# Patient Record
Sex: FEMALE | Race: OTHER | HISPANIC OR LATINO
[De-identification: names, ages, dates, MRNs, and addresses within clinical notes are randomized per-mention and may not be internally consistent; named-entity substitution may affect disease eponyms.]

---

## 2019-07-12 ENCOUNTER — TRANSCRIPTION ENCOUNTER (OUTPATIENT)
Age: 59
End: 2019-07-12

## 2019-07-16 ENCOUNTER — TRANSCRIPTION ENCOUNTER (OUTPATIENT)
Age: 59
End: 2019-07-16

## 2019-07-26 PROBLEM — Z00.00 ENCOUNTER FOR PREVENTIVE HEALTH EXAMINATION: Status: ACTIVE | Noted: 2019-07-26

## 2022-03-17 VITALS — BODY MASS INDEX: 28.7 KG/M2 | WEIGHT: 162 LBS | HEIGHT: 63 IN

## 2022-03-17 DIAGNOSIS — I27.20 PULMONARY HYPERTENSION, UNSPECIFIED: ICD-10-CM

## 2022-03-17 DIAGNOSIS — Z86.39 PERSONAL HISTORY OF OTHER ENDOCRINE, NUTRITIONAL AND METABOLIC DISEASE: ICD-10-CM

## 2022-03-17 DIAGNOSIS — I10 ESSENTIAL (PRIMARY) HYPERTENSION: ICD-10-CM

## 2022-03-17 RX ORDER — SIMVASTATIN 10 MG/1
10 TABLET, FILM COATED ORAL
Qty: 30 | Refills: 3 | Status: ACTIVE | COMMUNITY

## 2022-03-17 RX ORDER — LEVOTHYROXINE SODIUM 88 UG/1
88 TABLET ORAL DAILY
Refills: 0 | Status: ACTIVE | COMMUNITY

## 2022-03-17 RX ORDER — AMLODIPINE BESYLATE AND BENAZEPRIL HYDROCHLORIDE 5; 40 MG/1; MG/1
5-40 CAPSULE ORAL DAILY
Refills: 0 | Status: COMPLETED | COMMUNITY
End: 2022-03-17

## 2022-03-17 RX ORDER — AMLODIPINE BESYLATE 5 MG/1
5 TABLET ORAL
Refills: 0 | Status: ACTIVE | COMMUNITY

## 2022-03-22 ENCOUNTER — OUTPATIENT (OUTPATIENT)
Dept: OUTPATIENT SERVICES | Facility: HOSPITAL | Age: 62
LOS: 1 days | End: 2022-03-22
Payer: COMMERCIAL

## 2022-03-22 ENCOUNTER — RESULT REVIEW (OUTPATIENT)
Age: 62
End: 2022-03-22

## 2022-03-22 ENCOUNTER — NON-APPOINTMENT (OUTPATIENT)
Age: 62
End: 2022-03-22

## 2022-03-22 ENCOUNTER — APPOINTMENT (OUTPATIENT)
Dept: CARDIOTHORACIC SURGERY | Facility: CLINIC | Age: 62
End: 2022-03-22
Payer: COMMERCIAL

## 2022-03-22 VITALS
DIASTOLIC BLOOD PRESSURE: 72 MMHG | WEIGHT: 136 LBS | SYSTOLIC BLOOD PRESSURE: 133 MMHG | TEMPERATURE: 97.1 F | HEIGHT: 63 IN | HEART RATE: 54 BPM | OXYGEN SATURATION: 97 % | RESPIRATION RATE: 18 BRPM | BODY MASS INDEX: 24.1 KG/M2

## 2022-03-22 LAB
A1C WITH ESTIMATED AVERAGE GLUCOSE RESULT: 5.4 % — SIGNIFICANT CHANGE UP (ref 4–5.6)
ALBUMIN SERPL ELPH-MCNC: 4.1 G/DL — SIGNIFICANT CHANGE UP (ref 3.3–5)
ALP SERPL-CCNC: 225 U/L — HIGH (ref 40–120)
ALT FLD-CCNC: 27 U/L — SIGNIFICANT CHANGE UP (ref 10–45)
ANION GAP SERPL CALC-SCNC: 13 MMOL/L — SIGNIFICANT CHANGE UP (ref 5–17)
APPEARANCE UR: CLEAR — SIGNIFICANT CHANGE UP
APTT BLD: 34.2 SEC — SIGNIFICANT CHANGE UP (ref 27.5–35.5)
AST SERPL-CCNC: 38 U/L — SIGNIFICANT CHANGE UP (ref 10–40)
BASOPHILS # BLD AUTO: 0.04 K/UL — SIGNIFICANT CHANGE UP (ref 0–0.2)
BASOPHILS NFR BLD AUTO: 0.6 % — SIGNIFICANT CHANGE UP (ref 0–2)
BILIRUB SERPL-MCNC: 0.4 MG/DL — SIGNIFICANT CHANGE UP (ref 0.2–1.2)
BILIRUB UR-MCNC: NEGATIVE — SIGNIFICANT CHANGE UP
BUN SERPL-MCNC: 11 MG/DL — SIGNIFICANT CHANGE UP (ref 7–23)
CALCIUM SERPL-MCNC: 10.2 MG/DL — SIGNIFICANT CHANGE UP (ref 8.4–10.5)
CHLORIDE SERPL-SCNC: 105 MMOL/L — SIGNIFICANT CHANGE UP (ref 96–108)
CHOLEST SERPL-MCNC: 143 MG/DL — SIGNIFICANT CHANGE UP
CO2 SERPL-SCNC: 22 MMOL/L — SIGNIFICANT CHANGE UP (ref 22–31)
COLOR SPEC: YELLOW — SIGNIFICANT CHANGE UP
CREAT SERPL-MCNC: 0.49 MG/DL — LOW (ref 0.5–1.3)
DIFF PNL FLD: NEGATIVE — SIGNIFICANT CHANGE UP
EGFR: 107 ML/MIN/1.73M2 — SIGNIFICANT CHANGE UP
EOSINOPHIL # BLD AUTO: 0.21 K/UL — SIGNIFICANT CHANGE UP (ref 0–0.5)
EOSINOPHIL NFR BLD AUTO: 3 % — SIGNIFICANT CHANGE UP (ref 0–6)
ESTIMATED AVERAGE GLUCOSE: 108 MG/DL — SIGNIFICANT CHANGE UP (ref 68–114)
GLUCOSE SERPL-MCNC: 87 MG/DL — SIGNIFICANT CHANGE UP (ref 70–99)
GLUCOSE UR QL: NEGATIVE — SIGNIFICANT CHANGE UP
HBV SURFACE AG SER-ACNC: SIGNIFICANT CHANGE UP
HCG SERPL-ACNC: 3 MIU/ML — SIGNIFICANT CHANGE UP
HCT VFR BLD CALC: 39.7 % — SIGNIFICANT CHANGE UP (ref 34.5–45)
HDLC SERPL-MCNC: 68 MG/DL — SIGNIFICANT CHANGE UP
HGB BLD-MCNC: 13.4 G/DL — SIGNIFICANT CHANGE UP (ref 11.5–15.5)
IMM GRANULOCYTES NFR BLD AUTO: 0.4 % — SIGNIFICANT CHANGE UP (ref 0–1.5)
INR BLD: 0.95 — SIGNIFICANT CHANGE UP (ref 0.88–1.16)
KETONES UR-MCNC: NEGATIVE — SIGNIFICANT CHANGE UP
LEUKOCYTE ESTERASE UR-ACNC: NEGATIVE — SIGNIFICANT CHANGE UP
LIPID PNL WITH DIRECT LDL SERPL: 60 MG/DL — SIGNIFICANT CHANGE UP
LYMPHOCYTES # BLD AUTO: 2.3 K/UL — SIGNIFICANT CHANGE UP (ref 1–3.3)
LYMPHOCYTES # BLD AUTO: 33.1 % — SIGNIFICANT CHANGE UP (ref 13–44)
MCHC RBC-ENTMCNC: 27.6 PG — SIGNIFICANT CHANGE UP (ref 27–34)
MCHC RBC-ENTMCNC: 33.8 GM/DL — SIGNIFICANT CHANGE UP (ref 32–36)
MCV RBC AUTO: 81.7 FL — SIGNIFICANT CHANGE UP (ref 80–100)
MONOCYTES # BLD AUTO: 0.54 K/UL — SIGNIFICANT CHANGE UP (ref 0–0.9)
MONOCYTES NFR BLD AUTO: 7.8 % — SIGNIFICANT CHANGE UP (ref 2–14)
NEUTROPHILS # BLD AUTO: 3.83 K/UL — SIGNIFICANT CHANGE UP (ref 1.8–7.4)
NEUTROPHILS NFR BLD AUTO: 55.1 % — SIGNIFICANT CHANGE UP (ref 43–77)
NITRITE UR-MCNC: NEGATIVE — SIGNIFICANT CHANGE UP
NON HDL CHOLESTEROL: 75 MG/DL — SIGNIFICANT CHANGE UP
NRBC # BLD: 0 /100 WBCS — SIGNIFICANT CHANGE UP (ref 0–0)
PH UR: 7.5 — SIGNIFICANT CHANGE UP (ref 5–8)
PLATELET # BLD AUTO: 248 K/UL — SIGNIFICANT CHANGE UP (ref 150–400)
POTASSIUM SERPL-MCNC: 5.4 MMOL/L — HIGH (ref 3.5–5.3)
POTASSIUM SERPL-SCNC: 5.4 MMOL/L — HIGH (ref 3.5–5.3)
PROT SERPL-MCNC: 6.4 G/DL — SIGNIFICANT CHANGE UP (ref 6–8.3)
PROT UR-MCNC: NEGATIVE MG/DL — SIGNIFICANT CHANGE UP
PROTHROM AB SERPL-ACNC: 11.3 SEC — SIGNIFICANT CHANGE UP (ref 10.5–13.4)
RBC # BLD: 4.86 M/UL — SIGNIFICANT CHANGE UP (ref 3.8–5.2)
RBC # FLD: 14.7 % — HIGH (ref 10.3–14.5)
SODIUM SERPL-SCNC: 140 MMOL/L — SIGNIFICANT CHANGE UP (ref 135–145)
SP GR SPEC: 1.01 — SIGNIFICANT CHANGE UP (ref 1–1.03)
TRIGL SERPL-MCNC: 77 MG/DL — SIGNIFICANT CHANGE UP
TSH SERPL-MCNC: 2.17 UIU/ML — SIGNIFICANT CHANGE UP (ref 0.27–4.2)
UROBILINOGEN FLD QL: 0.2 E.U./DL — SIGNIFICANT CHANGE UP
WBC # BLD: 6.95 K/UL — SIGNIFICANT CHANGE UP (ref 3.8–10.5)
WBC # FLD AUTO: 6.95 K/UL — SIGNIFICANT CHANGE UP (ref 3.8–10.5)

## 2022-03-22 PROCEDURE — 85730 THROMBOPLASTIN TIME PARTIAL: CPT

## 2022-03-22 PROCEDURE — 84443 ASSAY THYROID STIM HORMONE: CPT

## 2022-03-22 PROCEDURE — 93000 ELECTROCARDIOGRAM COMPLETE: CPT

## 2022-03-22 PROCEDURE — 85610 PROTHROMBIN TIME: CPT

## 2022-03-22 PROCEDURE — 71046 X-RAY EXAM CHEST 2 VIEWS: CPT | Mod: 26

## 2022-03-22 PROCEDURE — 86850 RBC ANTIBODY SCREEN: CPT

## 2022-03-22 PROCEDURE — 71046 X-RAY EXAM CHEST 2 VIEWS: CPT

## 2022-03-22 PROCEDURE — 81003 URINALYSIS AUTO W/O SCOPE: CPT

## 2022-03-22 PROCEDURE — 83036 HEMOGLOBIN GLYCOSYLATED A1C: CPT

## 2022-03-22 PROCEDURE — 36415 COLL VENOUS BLD VENIPUNCTURE: CPT

## 2022-03-22 PROCEDURE — 85025 COMPLETE CBC W/AUTO DIFF WBC: CPT

## 2022-03-22 PROCEDURE — 86901 BLOOD TYPING SEROLOGIC RH(D): CPT

## 2022-03-22 PROCEDURE — 87340 HEPATITIS B SURFACE AG IA: CPT

## 2022-03-22 PROCEDURE — 80053 COMPREHEN METABOLIC PANEL: CPT

## 2022-03-22 PROCEDURE — 84702 CHORIONIC GONADOTROPIN TEST: CPT

## 2022-03-22 PROCEDURE — 86900 BLOOD TYPING SEROLOGIC ABO: CPT

## 2022-03-22 PROCEDURE — 80061 LIPID PANEL: CPT

## 2022-03-22 PROCEDURE — 99244 OFF/OP CNSLTJ NEW/EST MOD 40: CPT

## 2022-04-13 ENCOUNTER — NON-APPOINTMENT (OUTPATIENT)
Age: 62
End: 2022-04-13

## 2022-04-14 VITALS
WEIGHT: 136.03 LBS | RESPIRATION RATE: 16 BRPM | HEIGHT: 63 IN | SYSTOLIC BLOOD PRESSURE: 133 MMHG | TEMPERATURE: 97 F | OXYGEN SATURATION: 97 % | DIASTOLIC BLOOD PRESSURE: 72 MMHG | HEART RATE: 54 BPM

## 2022-04-14 NOTE — H&P ADULT - LYMPH NODES
not examined
Detail Level: Detailed
Products Recommended: Neutragena products
General Sunscreen Counseling: I recommended a broad spectrum sunscreen with a SPF of 30 or higher.  I explained that SPF 30 sunscreens block approximately 97 percent of the sun's harmful rays.  Sunscreens should be applied at least 15 minutes prior to expected sun exposure and then every 2 hours after that as long as sun exposure continues. If swimming or exercising sunscreen should be reapplied every 45 minutes to an hour after getting wet or sweating.  One ounce, or the equivalent of a shot glass full of sunscreen, is adequate to protect the skin not covered by a bathing suit. I also recommended a lip balm with a sunscreen as well. Sun protective clothing can be used in lieu of sunscreen but must be worn the entire time you are exposed to the sun's rays.

## 2022-04-14 NOTE — H&P ADULT - NSHPLABSRESULTS_GEN_ALL_CORE
Hgb A1C =5.4  creat = 0.49  hct= 39.7  hgb= 13.4  plt= 248  WBC= 6.95  INR= 0.95  tot alb= 4.1  tot bili= 0.4    TSH=2.170    3/22/22 Chest xray: dextroscoliosis. Cervical spine hardware. clear lungs    3/22/22 EKG: SB, 49 bpm    3/14/22 SANDRA:   1. LV systolic function is normal with EF 52-72%  2. severe mitral regurgitation  3. mild aortic regurgitation  4. no atherosclerotic plaque(s) in the ascending and descending aorta    3/7/22 Cardiac cath @ [facility]: normal coronary arteries. Severe MR    2/15/22 TTE:   1. normal LV size and function normal, EF 60%  2. left atrium is severely dilated  3. moderate aortic regurgitation  4. severe mitral regurgitation  5. moderate pulmonary HTN (PASP 45-55mmHg).

## 2022-04-14 NOTE — H&P ADULT - ASSESSMENT
60 yo female presents with PMH of HTN and HLD presents with severe MR with worsening symptoms of SOB/ALICEA. Dr. Faulkner reviewed the cardiac cath images and echocardiogram images with the patient and her  and discussed the case with Dr. Nguyen. Dr. Faulkner performed the STS risk calculator and quoted a 1% operative mortality and complication risk and discussed the STS risk factors with the patient including but not limited to death, heart attack, bleeding, stroke, kidney problems and infection. Dr. Faulkner also discussed surgical approaches, minimally invasive versus sternotomy. Dr. Faulkner feels the patient will benefit from and is a candidate for minimally invasive, Mitral valve repair. All questions were addressed and the patient agrees to proceed with surgery.     Plan:   PST  Covid test 4/15, results in HIE  SDA 4/18  patient instructed to take synthroid on morning of surgery  instructions provided re antibacterial showers and pt given 3 sponges

## 2022-04-14 NOTE — H&P ADULT - HISTORY OF PRESENT ILLNESS
62 yo female presents with PMH of HTN, HLD,hypothyroidism, aortic and mitral valve insufficiency. She is referred by Dr. Nguyen. She reports follow up by Dr. Nguyen every 6 months with serial echocardiograms until covid pandemic in 2020. In October 2021 she had echo that revealed worsening degree of mitral valve insufficiency. 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 NYHA 3. She denies c/o chest pain or shortness of breath at rest, fever, lower extremity edema, PND, orthopnea, syncope or palpitations. 3/7/22 Cardiac cath performed at [facility] revealed severe MR, no coronary artery disease. 3/14/22 SANDRA revealed severe MR, normal LV function.     She was seen in the outpatient office by Dr. Faulkner and now presents for elective surgery.

## 2022-04-14 NOTE — H&P ADULT - NSICDXPASTMEDICALHX_GEN_ALL_CORE_FT
PAST MEDICAL HISTORY:  HLD (hyperlipidemia)     HTN (hypertension)     Hypothyroidism     Pulmonary HTN

## 2022-04-15 NOTE — PATIENT PROFILE ADULT - FALL HARM RISK - UNIVERSAL INTERVENTIONS
Bed in lowest position, wheels locked, appropriate side rails in place/Call bell, personal items and telephone in reach/Instruct patient to call for assistance before getting out of bed or chair/Non-slip footwear when patient is out of bed/[place] to call system/Physically safe environment - no spills, clutter or unnecessary equipment/Purposeful Proactive Rounding/Room/bathroom lighting operational, light cord in reach

## 2022-04-17 ENCOUNTER — TRANSCRIPTION ENCOUNTER (OUTPATIENT)
Age: 62
End: 2022-04-17

## 2022-04-18 ENCOUNTER — APPOINTMENT (OUTPATIENT)
Dept: CARDIOTHORACIC SURGERY | Facility: HOSPITAL | Age: 62
End: 2022-04-18
Payer: COMMERCIAL

## 2022-04-18 ENCOUNTER — INPATIENT (INPATIENT)
Facility: HOSPITAL | Age: 62
LOS: 2 days | Discharge: HOME | DRG: 221 | End: 2022-04-21
Attending: THORACIC SURGERY (CARDIOTHORACIC VASCULAR SURGERY) | Admitting: THORACIC SURGERY (CARDIOTHORACIC VASCULAR SURGERY)
Payer: COMMERCIAL

## 2022-04-18 DIAGNOSIS — Z98.890 OTHER SPECIFIED POSTPROCEDURAL STATES: Chronic | ICD-10-CM

## 2022-04-18 LAB
ALBUMIN SERPL ELPH-MCNC: 3.4 G/DL — SIGNIFICANT CHANGE UP (ref 3.3–5)
ALBUMIN SERPL ELPH-MCNC: 3.9 G/DL — SIGNIFICANT CHANGE UP (ref 3.3–5)
ALBUMIN SERPL ELPH-MCNC: 4.7 G/DL — SIGNIFICANT CHANGE UP (ref 3.3–5)
ALP SERPL-CCNC: 77 U/L — SIGNIFICANT CHANGE UP (ref 40–120)
ALP SERPL-CCNC: 80 U/L — SIGNIFICANT CHANGE UP (ref 40–120)
ALP SERPL-CCNC: 91 U/L — SIGNIFICANT CHANGE UP (ref 40–120)
ALT FLD-CCNC: 26 U/L — SIGNIFICANT CHANGE UP (ref 10–45)
ALT FLD-CCNC: 27 U/L — SIGNIFICANT CHANGE UP (ref 10–45)
ALT FLD-CCNC: 32 U/L — SIGNIFICANT CHANGE UP (ref 10–45)
ANION GAP SERPL CALC-SCNC: 10 MMOL/L — SIGNIFICANT CHANGE UP (ref 5–17)
ANION GAP SERPL CALC-SCNC: 11 MMOL/L — SIGNIFICANT CHANGE UP (ref 5–17)
ANION GAP SERPL CALC-SCNC: 11 MMOL/L — SIGNIFICANT CHANGE UP (ref 5–17)
APTT BLD: 27.1 SEC — LOW (ref 27.5–35.5)
APTT BLD: 30.7 SEC — SIGNIFICANT CHANGE UP (ref 27.5–35.5)
APTT BLD: 89 SEC — HIGH (ref 27.5–35.5)
AST SERPL-CCNC: 40 U/L — SIGNIFICANT CHANGE UP (ref 10–40)
AST SERPL-CCNC: 42 U/L — HIGH (ref 10–40)
AST SERPL-CCNC: 48 U/L — HIGH (ref 10–40)
BASOPHILS # BLD AUTO: 0.04 K/UL — SIGNIFICANT CHANGE UP (ref 0–0.2)
BASOPHILS NFR BLD AUTO: 0.3 % — SIGNIFICANT CHANGE UP (ref 0–2)
BILIRUB SERPL-MCNC: 0.4 MG/DL — SIGNIFICANT CHANGE UP (ref 0.2–1.2)
BILIRUB SERPL-MCNC: 0.4 MG/DL — SIGNIFICANT CHANGE UP (ref 0.2–1.2)
BILIRUB SERPL-MCNC: 0.6 MG/DL — SIGNIFICANT CHANGE UP (ref 0.2–1.2)
BLD GP AB SCN SERPL QL: NEGATIVE — SIGNIFICANT CHANGE UP
BUN SERPL-MCNC: 12 MG/DL — SIGNIFICANT CHANGE UP (ref 7–23)
BUN SERPL-MCNC: 7 MG/DL — SIGNIFICANT CHANGE UP (ref 7–23)
BUN SERPL-MCNC: 9 MG/DL — SIGNIFICANT CHANGE UP (ref 7–23)
CALCIUM SERPL-MCNC: 8.6 MG/DL — SIGNIFICANT CHANGE UP (ref 8.4–10.5)
CALCIUM SERPL-MCNC: 8.8 MG/DL — SIGNIFICANT CHANGE UP (ref 8.4–10.5)
CALCIUM SERPL-MCNC: 8.8 MG/DL — SIGNIFICANT CHANGE UP (ref 8.4–10.5)
CHLORIDE SERPL-SCNC: 104 MMOL/L — SIGNIFICANT CHANGE UP (ref 96–108)
CHLORIDE SERPL-SCNC: 106 MMOL/L — SIGNIFICANT CHANGE UP (ref 96–108)
CHLORIDE SERPL-SCNC: 108 MMOL/L — SIGNIFICANT CHANGE UP (ref 96–108)
CO2 SERPL-SCNC: 24 MMOL/L — SIGNIFICANT CHANGE UP (ref 22–31)
CO2 SERPL-SCNC: 25 MMOL/L — SIGNIFICANT CHANGE UP (ref 22–31)
CO2 SERPL-SCNC: 25 MMOL/L — SIGNIFICANT CHANGE UP (ref 22–31)
CREAT SERPL-MCNC: 0.44 MG/DL — LOW (ref 0.5–1.3)
CREAT SERPL-MCNC: 0.51 MG/DL — SIGNIFICANT CHANGE UP (ref 0.5–1.3)
CREAT SERPL-MCNC: 0.53 MG/DL — SIGNIFICANT CHANGE UP (ref 0.5–1.3)
EGFR: 105 ML/MIN/1.73M2 — SIGNIFICANT CHANGE UP
EGFR: 106 ML/MIN/1.73M2 — SIGNIFICANT CHANGE UP
EGFR: 110 ML/MIN/1.73M2 — SIGNIFICANT CHANGE UP
EOSINOPHIL # BLD AUTO: 0.07 K/UL — SIGNIFICANT CHANGE UP (ref 0–0.5)
EOSINOPHIL NFR BLD AUTO: 0.4 % — SIGNIFICANT CHANGE UP (ref 0–6)
GAS PNL BLDA: SIGNIFICANT CHANGE UP
GLUCOSE BLDC GLUCOMTR-MCNC: 121 MG/DL — HIGH (ref 70–99)
GLUCOSE SERPL-MCNC: 116 MG/DL — HIGH (ref 70–99)
GLUCOSE SERPL-MCNC: 129 MG/DL — HIGH (ref 70–99)
GLUCOSE SERPL-MCNC: 148 MG/DL — HIGH (ref 70–99)
HCT VFR BLD CALC: 30.9 % — LOW (ref 34.5–45)
HCT VFR BLD CALC: 34.7 % — SIGNIFICANT CHANGE UP (ref 34.5–45)
HCT VFR BLD CALC: 36.7 % — SIGNIFICANT CHANGE UP (ref 34.5–45)
HGB BLD-MCNC: 10.5 G/DL — LOW (ref 11.5–15.5)
HGB BLD-MCNC: 11.7 G/DL — SIGNIFICANT CHANGE UP (ref 11.5–15.5)
HGB BLD-MCNC: 12.3 G/DL — SIGNIFICANT CHANGE UP (ref 11.5–15.5)
IMM GRANULOCYTES NFR BLD AUTO: 0.7 % — SIGNIFICANT CHANGE UP (ref 0–1.5)
INR BLD: 1.08 — SIGNIFICANT CHANGE UP (ref 0.88–1.16)
INR BLD: 1.11 — SIGNIFICANT CHANGE UP (ref 0.88–1.16)
INR BLD: 1.16 — SIGNIFICANT CHANGE UP (ref 0.88–1.16)
ISTAT ARTERIAL BE: 2 MMOL/L — SIGNIFICANT CHANGE UP (ref -2–3)
ISTAT ARTERIAL GLUCOSE: 107 MG/DL — HIGH (ref 70–99)
ISTAT ARTERIAL HCO3: 26 MMOL/L — SIGNIFICANT CHANGE UP (ref 22–26)
ISTAT ARTERIAL HEMATOCRIT: 30 % — LOW (ref 34.5–45)
ISTAT ARTERIAL HEMOGLOBIN: 10.2 G/DL — LOW (ref 11.5–15.5)
ISTAT ARTERIAL IONIZED CALCIUM: 1.18 MMOL/L — SIGNIFICANT CHANGE UP (ref 1.12–1.3)
ISTAT ARTERIAL PCO2: 38 MMHG — SIGNIFICANT CHANGE UP (ref 35–45)
ISTAT ARTERIAL PH: 7.44 — SIGNIFICANT CHANGE UP (ref 7.35–7.45)
ISTAT ARTERIAL PO2: 255 MMHG — HIGH (ref 80–105)
ISTAT ARTERIAL POTASSIUM: 3.1 MMOL/L — LOW (ref 3.5–5.3)
ISTAT ARTERIAL SO2: 100 % — HIGH (ref 95–98)
ISTAT ARTERIAL SODIUM: 141 MMOL/L — SIGNIFICANT CHANGE UP (ref 135–145)
ISTAT ARTERIAL TCO2: 27 MMOL/L — SIGNIFICANT CHANGE UP (ref 22–31)
LYMPHOCYTES # BLD AUTO: 1.48 K/UL — SIGNIFICANT CHANGE UP (ref 1–3.3)
LYMPHOCYTES # BLD AUTO: 9.4 % — LOW (ref 13–44)
MAGNESIUM SERPL-MCNC: 2.1 MG/DL — SIGNIFICANT CHANGE UP (ref 1.6–2.6)
MAGNESIUM SERPL-MCNC: 2.4 MG/DL — SIGNIFICANT CHANGE UP (ref 1.6–2.6)
MAGNESIUM SERPL-MCNC: 2.9 MG/DL — HIGH (ref 1.6–2.6)
MCHC RBC-ENTMCNC: 27 PG — SIGNIFICANT CHANGE UP (ref 27–34)
MCHC RBC-ENTMCNC: 27.2 PG — SIGNIFICANT CHANGE UP (ref 27–34)
MCHC RBC-ENTMCNC: 27.8 PG — SIGNIFICANT CHANGE UP (ref 27–34)
MCHC RBC-ENTMCNC: 33.5 GM/DL — SIGNIFICANT CHANGE UP (ref 32–36)
MCHC RBC-ENTMCNC: 33.7 GM/DL — SIGNIFICANT CHANGE UP (ref 32–36)
MCHC RBC-ENTMCNC: 34 GM/DL — SIGNIFICANT CHANGE UP (ref 32–36)
MCV RBC AUTO: 80.7 FL — SIGNIFICANT CHANGE UP (ref 80–100)
MCV RBC AUTO: 80.7 FL — SIGNIFICANT CHANGE UP (ref 80–100)
MCV RBC AUTO: 81.7 FL — SIGNIFICANT CHANGE UP (ref 80–100)
MONOCYTES # BLD AUTO: 0.76 K/UL — SIGNIFICANT CHANGE UP (ref 0–0.9)
MONOCYTES NFR BLD AUTO: 4.8 % — SIGNIFICANT CHANGE UP (ref 2–14)
NEUTROPHILS # BLD AUTO: 13.31 K/UL — HIGH (ref 1.8–7.4)
NEUTROPHILS NFR BLD AUTO: 84.4 % — HIGH (ref 43–77)
NRBC # BLD: 0 /100 WBCS — SIGNIFICANT CHANGE UP (ref 0–0)
PHOSPHATE SERPL-MCNC: 2.2 MG/DL — LOW (ref 2.5–4.5)
PHOSPHATE SERPL-MCNC: 2.8 MG/DL — SIGNIFICANT CHANGE UP (ref 2.5–4.5)
PHOSPHATE SERPL-MCNC: 3 MG/DL — SIGNIFICANT CHANGE UP (ref 2.5–4.5)
PLATELET # BLD AUTO: 146 K/UL — LOW (ref 150–400)
PLATELET # BLD AUTO: 167 K/UL — SIGNIFICANT CHANGE UP (ref 150–400)
PLATELET # BLD AUTO: 186 K/UL — SIGNIFICANT CHANGE UP (ref 150–400)
POTASSIUM SERPL-MCNC: 3.5 MMOL/L — SIGNIFICANT CHANGE UP (ref 3.5–5.3)
POTASSIUM SERPL-MCNC: 3.8 MMOL/L — SIGNIFICANT CHANGE UP (ref 3.5–5.3)
POTASSIUM SERPL-MCNC: 3.9 MMOL/L — SIGNIFICANT CHANGE UP (ref 3.5–5.3)
POTASSIUM SERPL-SCNC: 3.5 MMOL/L — SIGNIFICANT CHANGE UP (ref 3.5–5.3)
POTASSIUM SERPL-SCNC: 3.8 MMOL/L — SIGNIFICANT CHANGE UP (ref 3.5–5.3)
POTASSIUM SERPL-SCNC: 3.9 MMOL/L — SIGNIFICANT CHANGE UP (ref 3.5–5.3)
PROT SERPL-MCNC: 5.3 G/DL — LOW (ref 6–8.3)
PROT SERPL-MCNC: 6.2 G/DL — SIGNIFICANT CHANGE UP (ref 6–8.3)
PROT SERPL-MCNC: 6.7 G/DL — SIGNIFICANT CHANGE UP (ref 6–8.3)
PROTHROM AB SERPL-ACNC: 12.9 SEC — SIGNIFICANT CHANGE UP (ref 10.5–13.4)
PROTHROM AB SERPL-ACNC: 13.2 SEC — SIGNIFICANT CHANGE UP (ref 10.5–13.4)
PROTHROM AB SERPL-ACNC: 13.8 SEC — HIGH (ref 10.5–13.4)
RBC # BLD: 3.78 M/UL — LOW (ref 3.8–5.2)
RBC # BLD: 4.3 M/UL — SIGNIFICANT CHANGE UP (ref 3.8–5.2)
RBC # BLD: 4.55 M/UL — SIGNIFICANT CHANGE UP (ref 3.8–5.2)
RBC # FLD: 14.6 % — HIGH (ref 10.3–14.5)
RBC # FLD: 14.7 % — HIGH (ref 10.3–14.5)
RBC # FLD: 14.9 % — HIGH (ref 10.3–14.5)
RH IG SCN BLD-IMP: POSITIVE — SIGNIFICANT CHANGE UP
SODIUM SERPL-SCNC: 139 MMOL/L — SIGNIFICANT CHANGE UP (ref 135–145)
SODIUM SERPL-SCNC: 141 MMOL/L — SIGNIFICANT CHANGE UP (ref 135–145)
SODIUM SERPL-SCNC: 144 MMOL/L — SIGNIFICANT CHANGE UP (ref 135–145)
WBC # BLD: 15.49 K/UL — HIGH (ref 3.8–10.5)
WBC # BLD: 15.77 K/UL — HIGH (ref 3.8–10.5)
WBC # BLD: 18.56 K/UL — HIGH (ref 3.8–10.5)
WBC # FLD AUTO: 15.49 K/UL — HIGH (ref 3.8–10.5)
WBC # FLD AUTO: 15.77 K/UL — HIGH (ref 3.8–10.5)
WBC # FLD AUTO: 18.56 K/UL — HIGH (ref 3.8–10.5)

## 2022-04-18 PROCEDURE — 99233 SBSQ HOSP IP/OBS HIGH 50: CPT

## 2022-04-18 PROCEDURE — 71045 X-RAY EXAM CHEST 1 VIEW: CPT | Mod: 26

## 2022-04-18 PROCEDURE — ZZZZZ: CPT

## 2022-04-18 PROCEDURE — 93010 ELECTROCARDIOGRAM REPORT: CPT

## 2022-04-18 PROCEDURE — 33426 REPAIR OF MITRAL VALVE: CPT

## 2022-04-18 PROCEDURE — 33426 REPAIR OF MITRAL VALVE: CPT | Mod: AS

## 2022-04-18 PROCEDURE — 99291 CRITICAL CARE FIRST HOUR: CPT | Mod: 57

## 2022-04-18 DEVICE — CHEST DRAIN THORACIC PVC 28FR RIGHT ANGLE: Type: IMPLANTABLE DEVICE | Site: RIGHT | Status: FUNCTIONAL

## 2022-04-18 DEVICE — CHEST DRAIN THORACIC PVC 32FR RIGHT ANGLE: Type: IMPLANTABLE DEVICE | Site: RIGHT | Status: FUNCTIONAL

## 2022-04-18 DEVICE — COR-KNOT MINI DEVICE COMBO KIT: Type: IMPLANTABLE DEVICE | Site: RIGHT | Status: FUNCTIONAL

## 2022-04-18 DEVICE — PACING WIRE BLUE BIPOLAR INLINE BM SERIES 23MM CURVE SOLID 60CM 8MM: Type: IMPLANTABLE DEVICE | Site: RIGHT | Status: FUNCTIONAL

## 2022-04-18 DEVICE — CHEST DRAIN THORACIC PVC 32FR: Type: IMPLANTABLE DEVICE | Site: RIGHT | Status: FUNCTIONAL

## 2022-04-18 DEVICE — IMPLANTABLE DEVICE: Type: IMPLANTABLE DEVICE | Site: RIGHT | Status: FUNCTIONAL

## 2022-04-18 DEVICE — COR-KNOT QUICK LOAD SINGLES: Type: IMPLANTABLE DEVICE | Site: RIGHT | Status: FUNCTIONAL

## 2022-04-18 RX ORDER — ALBUMIN HUMAN 25 %
250 VIAL (ML) INTRAVENOUS
Refills: 0 | Status: COMPLETED | OUTPATIENT
Start: 2022-04-18 | End: 2022-04-18

## 2022-04-18 RX ORDER — BUPIVACAINE 13.3 MG/ML
20 INJECTION, SUSPENSION, LIPOSOMAL INFILTRATION ONCE
Refills: 0 | Status: DISCONTINUED | OUTPATIENT
Start: 2022-04-18 | End: 2022-04-18

## 2022-04-18 RX ORDER — KETOROLAC TROMETHAMINE 30 MG/ML
15 SYRINGE (ML) INJECTION ONCE
Refills: 0 | Status: DISCONTINUED | OUTPATIENT
Start: 2022-04-18 | End: 2022-04-18

## 2022-04-18 RX ORDER — CHOLECALCIFEROL (VITAMIN D3) 125 MCG
1 CAPSULE ORAL
Qty: 0 | Refills: 0 | DISCHARGE

## 2022-04-18 RX ORDER — POLYETHYLENE GLYCOL 3350 17 G/17G
17 POWDER, FOR SOLUTION ORAL DAILY
Refills: 0 | Status: DISCONTINUED | OUTPATIENT
Start: 2022-04-18 | End: 2022-04-18

## 2022-04-18 RX ORDER — ACETAMINOPHEN 500 MG
650 TABLET ORAL EVERY 6 HOURS
Refills: 0 | Status: DISCONTINUED | OUTPATIENT
Start: 2022-04-18 | End: 2022-04-21

## 2022-04-18 RX ORDER — PANTOPRAZOLE SODIUM 20 MG/1
40 TABLET, DELAYED RELEASE ORAL DAILY
Refills: 0 | Status: DISCONTINUED | OUTPATIENT
Start: 2022-04-18 | End: 2022-04-18

## 2022-04-18 RX ORDER — LIDOCAINE 4 G/100G
1 CREAM TOPICAL DAILY
Refills: 0 | Status: DISCONTINUED | OUTPATIENT
Start: 2022-04-18 | End: 2022-04-21

## 2022-04-18 RX ORDER — SODIUM CHLORIDE 9 MG/ML
1000 INJECTION, SOLUTION INTRAVENOUS
Refills: 0 | Status: DISCONTINUED | OUTPATIENT
Start: 2022-04-18 | End: 2022-04-19

## 2022-04-18 RX ORDER — POTASSIUM CHLORIDE 20 MEQ
20 PACKET (EA) ORAL ONCE
Refills: 0 | Status: COMPLETED | OUTPATIENT
Start: 2022-04-18 | End: 2022-04-18

## 2022-04-18 RX ORDER — ACETAMINOPHEN 500 MG
1000 TABLET ORAL ONCE
Refills: 0 | Status: COMPLETED | OUTPATIENT
Start: 2022-04-18 | End: 2022-04-18

## 2022-04-18 RX ORDER — LEVOTHYROXINE SODIUM 125 MCG
1 TABLET ORAL
Qty: 0 | Refills: 0 | DISCHARGE

## 2022-04-18 RX ORDER — CHLORHEXIDINE GLUCONATE 213 G/1000ML
1 SOLUTION TOPICAL
Refills: 0 | Status: DISCONTINUED | OUTPATIENT
Start: 2022-04-18 | End: 2022-04-19

## 2022-04-18 RX ORDER — DEXTROSE 50 % IN WATER 50 %
25 SYRINGE (ML) INTRAVENOUS ONCE
Refills: 0 | Status: DISCONTINUED | OUTPATIENT
Start: 2022-04-18 | End: 2022-04-19

## 2022-04-18 RX ORDER — GLUCAGON INJECTION, SOLUTION 0.5 MG/.1ML
1 INJECTION, SOLUTION SUBCUTANEOUS ONCE
Refills: 0 | Status: DISCONTINUED | OUTPATIENT
Start: 2022-04-18 | End: 2022-04-21

## 2022-04-18 RX ORDER — FENTANYL CITRATE 50 UG/ML
25 INJECTION INTRAVENOUS ONCE
Refills: 0 | Status: DISCONTINUED | OUTPATIENT
Start: 2022-04-18 | End: 2022-04-18

## 2022-04-18 RX ORDER — OXYCODONE HYDROCHLORIDE 5 MG/1
10 TABLET ORAL EVERY 6 HOURS
Refills: 0 | Status: DISCONTINUED | OUTPATIENT
Start: 2022-04-18 | End: 2022-04-21

## 2022-04-18 RX ORDER — SODIUM CHLORIDE 9 MG/ML
500 INJECTION, SOLUTION INTRAVENOUS ONCE
Refills: 0 | Status: COMPLETED | OUTPATIENT
Start: 2022-04-18 | End: 2022-04-18

## 2022-04-18 RX ORDER — INSULIN LISPRO 100/ML
VIAL (ML) SUBCUTANEOUS
Refills: 0 | Status: DISCONTINUED | OUTPATIENT
Start: 2022-04-18 | End: 2022-04-21

## 2022-04-18 RX ORDER — FOLIC ACID 0.8 MG
1 TABLET ORAL
Qty: 0 | Refills: 0 | DISCHARGE

## 2022-04-18 RX ORDER — HEPARIN SODIUM 5000 [USP'U]/ML
5000 INJECTION INTRAVENOUS; SUBCUTANEOUS EVERY 8 HOURS
Refills: 0 | Status: DISCONTINUED | OUTPATIENT
Start: 2022-04-18 | End: 2022-04-18

## 2022-04-18 RX ORDER — FOLIC ACID 0.8 MG
1 TABLET ORAL DAILY
Refills: 0 | Status: DISCONTINUED | OUTPATIENT
Start: 2022-04-18 | End: 2022-04-21

## 2022-04-18 RX ORDER — PREGABALIN 225 MG/1
1 CAPSULE ORAL
Qty: 0 | Refills: 0 | DISCHARGE

## 2022-04-18 RX ORDER — PROPOFOL 10 MG/ML
5 INJECTION, EMULSION INTRAVENOUS
Qty: 1000 | Refills: 0 | Status: DISCONTINUED | OUTPATIENT
Start: 2022-04-18 | End: 2022-04-18

## 2022-04-18 RX ORDER — SIMVASTATIN 20 MG/1
10 TABLET, FILM COATED ORAL AT BEDTIME
Refills: 0 | Status: DISCONTINUED | OUTPATIENT
Start: 2022-04-18 | End: 2022-04-21

## 2022-04-18 RX ORDER — POTASSIUM CHLORIDE 20 MEQ
20 PACKET (EA) ORAL
Refills: 0 | Status: COMPLETED | OUTPATIENT
Start: 2022-04-18 | End: 2022-04-18

## 2022-04-18 RX ORDER — ASPIRIN/CALCIUM CARB/MAGNESIUM 324 MG
81 TABLET ORAL DAILY
Refills: 0 | Status: DISCONTINUED | OUTPATIENT
Start: 2022-04-19 | End: 2022-04-21

## 2022-04-18 RX ORDER — LEVOTHYROXINE SODIUM 125 MCG
88 TABLET ORAL DAILY
Refills: 0 | Status: DISCONTINUED | OUTPATIENT
Start: 2022-04-18 | End: 2022-04-21

## 2022-04-18 RX ORDER — DEXTROSE 50 % IN WATER 50 %
25 SYRINGE (ML) INTRAVENOUS
Refills: 0 | Status: DISCONTINUED | OUTPATIENT
Start: 2022-04-18 | End: 2022-04-18

## 2022-04-18 RX ORDER — NICARDIPINE HYDROCHLORIDE 30 MG/1
5 CAPSULE, EXTENDED RELEASE ORAL
Qty: 40 | Refills: 0 | Status: DISCONTINUED | OUTPATIENT
Start: 2022-04-18 | End: 2022-04-19

## 2022-04-18 RX ORDER — CHLORHEXIDINE GLUCONATE 213 G/1000ML
5 SOLUTION TOPICAL
Refills: 0 | Status: DISCONTINUED | OUTPATIENT
Start: 2022-04-18 | End: 2022-04-19

## 2022-04-18 RX ORDER — DEXTROSE 50 % IN WATER 50 %
50 SYRINGE (ML) INTRAVENOUS
Refills: 0 | Status: DISCONTINUED | OUTPATIENT
Start: 2022-04-18 | End: 2022-04-18

## 2022-04-18 RX ORDER — INSULIN HUMAN 100 [IU]/ML
1 INJECTION, SOLUTION SUBCUTANEOUS
Qty: 50 | Refills: 0 | Status: DISCONTINUED | OUTPATIENT
Start: 2022-04-18 | End: 2022-04-18

## 2022-04-18 RX ORDER — PANTOPRAZOLE SODIUM 20 MG/1
40 TABLET, DELAYED RELEASE ORAL
Refills: 0 | Status: DISCONTINUED | OUTPATIENT
Start: 2022-04-18 | End: 2022-04-21

## 2022-04-18 RX ORDER — SODIUM CHLORIDE 9 MG/ML
1000 INJECTION INTRAMUSCULAR; INTRAVENOUS; SUBCUTANEOUS
Refills: 0 | Status: DISCONTINUED | OUTPATIENT
Start: 2022-04-18 | End: 2022-04-19

## 2022-04-18 RX ORDER — DEXTROSE 50 % IN WATER 50 %
12.5 SYRINGE (ML) INTRAVENOUS ONCE
Refills: 0 | Status: DISCONTINUED | OUTPATIENT
Start: 2022-04-18 | End: 2022-04-19

## 2022-04-18 RX ORDER — DEXMEDETOMIDINE HYDROCHLORIDE IN 0.9% SODIUM CHLORIDE 4 UG/ML
0.1 INJECTION INTRAVENOUS
Qty: 400 | Refills: 0 | Status: DISCONTINUED | OUTPATIENT
Start: 2022-04-18 | End: 2022-04-19

## 2022-04-18 RX ORDER — CHLORHEXIDINE GLUCONATE 213 G/1000ML
15 SOLUTION TOPICAL EVERY 12 HOURS
Refills: 0 | Status: DISCONTINUED | OUTPATIENT
Start: 2022-04-18 | End: 2022-04-18

## 2022-04-18 RX ORDER — DEXTROSE 50 % IN WATER 50 %
15 SYRINGE (ML) INTRAVENOUS ONCE
Refills: 0 | Status: DISCONTINUED | OUTPATIENT
Start: 2022-04-18 | End: 2022-04-19

## 2022-04-18 RX ORDER — POLYETHYLENE GLYCOL 3350 17 G/17G
17 POWDER, FOR SOLUTION ORAL DAILY
Refills: 0 | Status: DISCONTINUED | OUTPATIENT
Start: 2022-04-18 | End: 2022-04-21

## 2022-04-18 RX ORDER — CEFAZOLIN SODIUM 1 G
2000 VIAL (EA) INJECTION EVERY 8 HOURS
Refills: 0 | Status: COMPLETED | OUTPATIENT
Start: 2022-04-18 | End: 2022-04-20

## 2022-04-18 RX ORDER — OXYCODONE HYDROCHLORIDE 5 MG/1
5 TABLET ORAL EVERY 6 HOURS
Refills: 0 | Status: DISCONTINUED | OUTPATIENT
Start: 2022-04-18 | End: 2022-04-21

## 2022-04-18 RX ADMIN — LIDOCAINE 1 PATCH: 4 CREAM TOPICAL at 18:07

## 2022-04-18 RX ADMIN — Medication 125 MILLILITER(S): at 16:16

## 2022-04-18 RX ADMIN — NICARDIPINE HYDROCHLORIDE 25 MG/HR: 30 CAPSULE, EXTENDED RELEASE ORAL at 21:07

## 2022-04-18 RX ADMIN — Medication 100 MILLIGRAM(S): at 23:09

## 2022-04-18 RX ADMIN — Medication 100 MILLIEQUIVALENT(S): at 22:02

## 2022-04-18 RX ADMIN — SIMVASTATIN 10 MILLIGRAM(S): 20 TABLET, FILM COATED ORAL at 21:07

## 2022-04-18 RX ADMIN — Medication 15 MILLIGRAM(S): at 20:45

## 2022-04-18 RX ADMIN — Medication 400 MILLIGRAM(S): at 21:00

## 2022-04-18 RX ADMIN — Medication 100 MILLIEQUIVALENT(S): at 13:26

## 2022-04-18 RX ADMIN — Medication 15 MILLIGRAM(S): at 15:15

## 2022-04-18 RX ADMIN — FENTANYL CITRATE 25 MICROGRAM(S): 50 INJECTION INTRAVENOUS at 16:16

## 2022-04-18 RX ADMIN — Medication 15 MILLIGRAM(S): at 14:52

## 2022-04-18 RX ADMIN — DEXMEDETOMIDINE HYDROCHLORIDE IN 0.9% SODIUM CHLORIDE 1.47 MICROGRAM(S)/KG/HR: 4 INJECTION INTRAVENOUS at 21:07

## 2022-04-18 RX ADMIN — Medication 100 MILLIGRAM(S): at 16:58

## 2022-04-18 RX ADMIN — PROPOFOL 1.77 MICROGRAM(S)/KG/MIN: 10 INJECTION, EMULSION INTRAVENOUS at 13:28

## 2022-04-18 RX ADMIN — NICARDIPINE HYDROCHLORIDE 25 MG/HR: 30 CAPSULE, EXTENDED RELEASE ORAL at 13:26

## 2022-04-18 RX ADMIN — Medication 1000 MILLIGRAM(S): at 21:30

## 2022-04-18 RX ADMIN — FENTANYL CITRATE 25 MICROGRAM(S): 50 INJECTION INTRAVENOUS at 16:36

## 2022-04-18 RX ADMIN — Medication 100 MILLIEQUIVALENT(S): at 13:59

## 2022-04-18 RX ADMIN — Medication 15 MILLIGRAM(S): at 20:04

## 2022-04-18 RX ADMIN — Medication 400 MILLIGRAM(S): at 14:19

## 2022-04-18 RX ADMIN — LIDOCAINE 1 PATCH: 4 CREAM TOPICAL at 19:09

## 2022-04-18 RX ADMIN — DEXMEDETOMIDINE HYDROCHLORIDE IN 0.9% SODIUM CHLORIDE 1.47 MICROGRAM(S)/KG/HR: 4 INJECTION INTRAVENOUS at 13:29

## 2022-04-18 RX ADMIN — OXYCODONE HYDROCHLORIDE 10 MILLIGRAM(S): 5 TABLET ORAL at 23:15

## 2022-04-18 RX ADMIN — Medication 125 MILLILITER(S): at 15:56

## 2022-04-18 RX ADMIN — SODIUM CHLORIDE 1000 MILLILITER(S): 9 INJECTION, SOLUTION INTRAVENOUS at 14:19

## 2022-04-18 RX ADMIN — Medication 1000 MILLIGRAM(S): at 14:30

## 2022-04-18 NOTE — AIRWAY REMOVAL NOTE  ADULT & PEDS - RESPIRATORY EXPANSION/ACCESSORY MUSCLES/RETRACTIONS
Continuity of Care Form    Patient Name: Roselia Ram   :  1941  MRN:  [id number]    Healthcare power of   Ravinder Hamilton  [phone]          Admitting Physician:  Pat Peña MD  PCP: Art Duque MD    Discharging Nurse: [facility] Unit/Room#: 0214/0214-01  Discharging Unit Phone Number: ***    Emergency Contact:   Extended Emergency Contact Information  Primary Emergency Contact: [address] [facility] of [address] [place] [address] Phone: [phone]  Relation: Spouse  Secondary Emergency Contact: Aubrey Soto  [place] Phone: [phone]  Relation: Child   needed? Yes    Past Surgical History:  Past Surgical History:   Procedure Laterality Date    COLONOSCOPY N/A 2019    COLONOSCOPY WITH STENT performed by Paulina Martin MD at [address] [place] [address]  10/27/2009    SMALL INTESTINE SURGERY N/A 2019    BOWEL RESECTION SIGMOID COLECTOMY,  COLOSTOMY performed by Babak Brenner MD at [facility] [place] [facility]       [facility] DME Belongings:[id number]}    [facility]  · Address: [address] [place] [address]. Zuleima Bullock  · Phone: [phone]  · Fax: [phone]      / signature: Electronically signed by DARLING Parsons on 6/12/2019 at 11:32 AM      PHYSICIAN SECTION    Prognosis: {Prognosis:[id number]}    Condition at Discharge: [address] [place] [address] Patient Condition:[id number]}    TCWQY:[id number]}    PHYSICIAN SIGNATURE:  {Esignature:[id number]} no use of accessory muscles/no retractions/expansion symmetric

## 2022-04-18 NOTE — BRIEF OPERATIVE NOTE - NSICDXBRIEFPROCEDURE_GEN_ALL_CORE_FT
PROCEDURES:  Repair, mitral valve, with SANDRA 18-Apr-2022 12:04:55 Minimally invasive MV repair 28mm ring EF 45-50% Tone Bernstein

## 2022-04-18 NOTE — PROGRESS NOTE ADULT - SUBJECTIVE AND OBJECTIVE BOX
CTICU  CRITICAL  CARE  attending     Hand off received 					   Pertinent clinical, laboratory, radiographic, hemodynamic, echocardiographic, respiratory data, microbiologic data and chart were reviewed and analyzed frequently throughout the course of the day and night        61 years female with HTN, HLD, hypothyroidism, aortic and mitral valve insufficiency.   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 NYHA 3.  She follow up by Dr. Nguyen every 6 months with serial echocardiograms until covid pandemic in 2020.  In October 2021 she had echo that revealed worsening degree of mitral valve insufficiency.   She denies c/o chest pain or shortness of breath at rest, fever, lower extremity edema, PND, orthopnea, syncope or palpitations.   3/7/22 Cardiac [facility] performed at [facility] revealed severe MR, no coronary artery disease.   3/14/22 SANDRA revealed severe MR, normal LV function.  S/P Mitral valve repair.        FAMILY HISTORY:  No pertinent family history in first degree relatives    PAST MEDICAL & SURGICAL HISTORY:  HTN (hypertension)  HLD (hyperlipidemia)  Hypothyroidism  Pulmonary HTN  H/O cervical spine surgery  H/O lumbosacral spine surgery          14 system review was unremarkable    Vital signs, hemodynamic and respiratory parameters were reviewed from the bedside nursing flow sheet.  ICU Vital Signs Last 24 Hrs  T(C): 36.5 (18 Apr 2022 21:12), Max: 36.5 (18 Apr 2022 21:12)  T(F): 97.7 (18 Apr 2022 21:12), Max: 97.7 (18 Apr 2022 21:12)  HR: 79 (18 Apr 2022 20:00) (76 - 96)  BP: 133/76 (18 Apr 2022 12:15) (133/76 - 133/76)  BP(mean): 99 (18 Apr 2022 12:15) (99 - 99)  ABP: 142/61 (18 Apr 2022 20:00) (90/51 - 166/85)  ABP(mean): 88 (18 Apr 2022 20:00) (66 - 116)  RR: 16 (18 Apr 2022 20:00) (12 - 21)  SpO2: 100% (18 Apr 2022 20:00) (100% - 100%)    Adult Advanced Hemodynamics Last 24 Hrs  CVP(mm Hg): 0 (18 Apr 2022 20:00) (-1 - 12)  CVP(cm H2O): --  CO: --  CI: --  PA: --  PA(mean): --  PCWP: --  SVR: --  SVRI: --  PVR: --  PVRI: --, ABG - ( 18 Apr 2022 15:15 )  pH, Arterial: 7.39  pH, Blood: x     /  pCO2: 42    /  pO2: 129   / HCO3: 25    / Base Excess: 0.3   /  SaO2: 99.4              Mode: AC/ CMV (Assist Control/ Continuous Mandatory Ventilation)  RR (machine): 12  TV (machine): 450  FiO2: 100  PEEP: 5  ITime: 1  MAP: 7  PIP: 17    Intake and output was reviewed and the fluid balance was calculated  Daily     Daily   I&O's Summary    18 Apr 2022 07:01  -  18 Apr 2022 21:24  --------------------------------------------------------  IN: 2149 mL / OUT: 3075 mL / NET: -926 mL        All lines and drain sites were assessed    Neuro: No change in the mental status from the baseline. Follows commands. Moves all 4 extremities.  Neck: No JVD.  CVS: S1, S2, No S3.  Lungs: Diminished d air entry on the right side.  Abd: Soft. No tenderness. + Bowel sounds.  Vascular: + DP/PT.  Extremities: No edema.  Lymphatic: Normal.  Skin: No abnormalities.      labs  CBC Full  -  ( 18 Apr 2022 14:12 )  WBC Count : 18.56 K/uL  RBC Count : 4.55 M/uL  Hemoglobin : 12.3 g/dL  Hematocrit : 36.7 %  Platelet Count - Automated : 186 K/uL  Mean Cell Volume : 80.7 fl  Mean Cell Hemoglobin : 27.0 pg  Mean Cell Hemoglobin Concentration : 33.5 gm/dL  Auto Neutrophil # : x  Auto Lymphocyte # : x  Auto Monocyte # : x  Auto Eosinophil # : x  Auto Basophil # : x  Auto Neutrophil % : x  Auto Lymphocyte % : x  Auto Monocyte % : x  Auto Eosinophil % : x  Auto Basophil % : x    04-18    141  |  106  |  9   ----------------------------<  148<H>  3.9   |  25  |  0.51    Ca    8.6      18 Apr 2022 14:12  Phos  2.8     04-18  Mg     2.4     04-18    TPro  6.2  /  Alb  3.9  /  TBili  0.6  /  DBili  x   /  AST  48<H>  /  ALT  32  /  AlkPhos  91  04-18    PT/INR - ( 18 Apr 2022 14:12 )   PT: 12.9 sec;   INR: 1.08          PTT - ( 18 Apr 2022 14:12 )  PTT:89.0 sec  The current medications were reviewed   MEDICATIONS  (STANDING):  ceFAZolin   IVPB 2000 milliGRAM(s) IV Intermittent every 8 hours  chlorhexidine 0.12% Liquid 5 milliLiter(s) Oral Mucosa two times a day  chlorhexidine 2% Cloths 1 Application(s) Topical <User Schedule>  dexMEDEtomidine Infusion 0.1 MICROgram(s)/kG/Hr (1.47 mL/Hr) IV Continuous <Continuous>  dextrose 5%. 1000 milliLiter(s) (50 mL/Hr) IV Continuous <Continuous>  dextrose 5%. 1000 milliLiter(s) (100 mL/Hr) IV Continuous <Continuous>  dextrose 50% Injectable 25 Gram(s) IV Push once  dextrose 50% Injectable 12.5 Gram(s) IV Push once  dextrose 50% Injectable 25 Gram(s) IV Push once  folic acid 1 milliGRAM(s) Oral daily  glucagon  Injectable 1 milliGRAM(s) IntraMuscular once  insulin lispro (ADMELOG) corrective regimen sliding scale   SubCutaneous Before meals and at bedtime  levothyroxine 88 MICROGram(s) Oral daily  lidocaine   4% Patch 1 Patch Transdermal daily  niCARdipine Infusion 5 mG/Hr (25 mL/Hr) IV Continuous <Continuous>  pantoprazole    Tablet 40 milliGRAM(s) Oral before breakfast  polyethylene glycol 3350 17 Gram(s) Oral daily  simvastatin 10 milliGRAM(s) Oral at bedtime  sodium chloride 0.9%. 1000 milliLiter(s) (10 mL/Hr) IV Continuous <Continuous>    MEDICATIONS  (PRN):  acetaminophen     Tablet .. 650 milliGRAM(s) Oral every 6 hours PRN Temp greater or equal to 38C (100.4F), Mild Pain (1 - 3)  dextrose Oral Gel 15 Gram(s) Oral once PRN Blood Glucose LESS THAN 70 milliGRAM(s)/deciliter  oxyCODONE    IR 5 milliGRAM(s) Oral every 6 hours PRN Moderate Pain (4 - 6)  oxyCODONE    IR 10 milliGRAM(s) Oral every 6 hours PRN Severe Pain (7 - 10)              61y old  Female with severe Mitral Valve Regurgitation.  S/P Mitral valve repair  S/P Mitral valve ring annuloplasty.  Hemodynamically stable.  Good oxygenation.  Fair urine out put.        My plan includes :  Statin and Betablocker.  Close hemodynamic, ventilatory and drain monitoring and management  Monitor for arrhythmias and monitor parameters for organ perfusion  Monitor neurologic status  Monitor renal function.  Head of the bed should remain elevated to 45 deg .   Chest PT and IS will be encouraged  Monitor adequacy of oxygenation and ventilation and attempt to wean oxygen  Nutritional goals will be met using po eventually , ensure adequate caloric intake and monitor the same  Stress ulcer and VTE prophylaxis will be achieved    Glycemic control is satisfactory  Electrolytes have been repleted as necessary and wound care has been carried out. Pain control has been achieved.   Aggressive physical therapy and early mobility and ambulation goals will be met   The family was updated about the course and plan  CRITICAL CARE TIME SPENT in evaluation and management, reassessments, review and interpretation of labs and x-rays, ventilator and hemodynamic management, formulating a plan and coordinating care: ___30____ MIN.  Time does not include procedural time.  CTICU ATTENDING     					    Serjio Victoria MD

## 2022-04-18 NOTE — PROGRESS NOTE ADULT - SUBJECTIVE AND OBJECTIVE BOX
CTICU  CRITICAL  CARE  attending     Hand off received 					   Pertinent clinical, laboratory, radiographic, hemodynamic, echocardiographic, respiratory data, microbiologic data and chart were reviewed and analyzed frequently throughout the course of the day and night  Patient seen and examined with CTS/ SH attending at bedside  Pt is a 61y , Female, HEALTH ISSUES - PROBLEM Dx:      , FAMILY HISTORY:  No pertinent family history in first degree relatives    PAST MEDICAL & SURGICAL HISTORY:  HTN (hypertension)    HLD (hyperlipidemia)    Hypothyroidism    Pulmonary HTN    H/O cervical spine surgery    H/O lumbosacral spine surgery      Patient is a 61y old  Female who presents with a chief complaint of Mitral Valve Regurgitation (14 Apr 2022 14:21)      14 system review limited by mentation and multiorgan morbidity     Vital signs, hemodynamic and respiratory parameters were reviewed from the bedside nursing flowsheet.  ICU Vital Signs Last 24 Hrs  T(C): --  T(F): --  HR: 88 (18 Apr 2022 12:00) (88 - 88)  BP: --  BP(mean): --  ABP: 117/62 (18 Apr 2022 12:00) (117/62 - 117/62)  ABP(mean): 83 (18 Apr 2022 12:00) (83 - 83)  RR: 12 (18 Apr 2022 12:00) (12 - 12)  SpO2: 100% (18 Apr 2022 12:00) (100% - 100%)    Adult Advanced Hemodynamics Last 24 Hrs  CVP(mm Hg): 2 (18 Apr 2022 12:00) (2 - 2)  CVP(cm H2O): --  CO: --  CI: --  PA: --  PA(mean): --  PCWP: --  SVR: --  SVRI: --  PVR: --  PVRI: --, ABG - ( 18 Apr 2022 12:06 )  pH, Arterial: 7.44  pH, Blood: x     /  pCO2: 37    /  pO2: 224   / HCO3: 25    / Base Excess: 1.1   /  SaO2: 99.1                Intake and output was reviewed and the fluid balance was calculated  Daily     Daily   I&O's Summary    18 Apr 2022 07:01  -  18 Apr 2022 12:33  --------------------------------------------------------  IN: 0 mL / OUT: 225 mL / NET: -225 mL        All lines and drain sites were assessed  Glycemic trend was reviewedCAPILLARY BLOOD GLUCOSE        No acute change in focality  Auscultation of the chest reveals equal bs  Abdomen is soft  Extremities are warm and well perfused  Wounds appear clean and unremarkable  Antibiotics are periop    labs  CBC Full  -  ( 18 Apr 2022 12:08 )  WBC Count : 15.77 K/uL  RBC Count : 3.78 M/uL  Hemoglobin : 10.5 g/dL  Hematocrit : 30.9 %  Platelet Count - Automated : 146 K/uL  Mean Cell Volume : 81.7 fl  Mean Cell Hemoglobin : 27.8 pg  Mean Cell Hemoglobin Concentration : 34.0 gm/dL  Auto Neutrophil # : 13.31 K/uL  Auto Lymphocyte # : 1.48 K/uL  Auto Monocyte # : 0.76 K/uL  Auto Eosinophil # : 0.07 K/uL  Auto Basophil # : 0.04 K/uL  Auto Neutrophil % : 84.4 %  Auto Lymphocyte % : 9.4 %  Auto Monocyte % : 4.8 %  Auto Eosinophil % : 0.4 %  Auto Basophil % : 0.3 %    04-18    144  |  108  |  12  ----------------------------<  116<H>  3.5   |  25  |  0.53    Ca    8.8      18 Apr 2022 12:08      PT/INR - ( 18 Apr 2022 12:08 )   PT: 13.8 sec;   INR: 1.16          PTT - ( 18 Apr 2022 12:08 )  PTT:27.1 sec  The current medications were reviewed   MEDICATIONS  (STANDING):  BUpivacaine liposome 1.3% Injectable (no eMAR) 20 milliLiter(s) Local Injection once  ceFAZolin   IVPB 2000 milliGRAM(s) IV Intermittent every 8 hours    MEDICATIONS  (PRN):       PROBLEM LIST/ ASSESSMENT:  HEALTH ISSUES - PROBLEM Dx:      ,   Patient is a 61y old  Female who presents with a chief complaint of Mitral Valve Regurgitation (14 Apr 2022 14:21)     s/p cardiac surgery    PROCEDURES:     Repair, mitral valve, with SANDRA 18-Apr-2022 12:04:55 Minimally invasive MV repair 28mm ring EF 45-50%               60 yo female presents with PMH of HTN, HLD,hypothyroidism, aortic and mitral valve insufficiency. She is referred by Dr. Nguyen. She reports follow up by Dr. Nguyen every 6 months with serial echocardiograms until covid pandemic in 2020. In October 2021 she had echo that revealed worsening degree of mitral valve insufficiency. 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 NYHA 3. She denies c/o chest pain or shortness of breath at rest, fever, lower extremity edema, PND, orthopnea, syncope or palpitations. 3/7/22 Cardiac cath performed at [facility] revealed severe MR, no coronary artery disease. 3/14/22 SANDRA revealed severe MR, normal LV function.          Fast track      Acute hypoxic resp failure on supplemental oxygen , vented, wean per post op   Neuro monitoring closely for focal deficits   Cardiovascular support, map > 80 target with vasopressor drips as needed  Renal function closely monitored for UO and goal net negative status   Early mobility, diet and ambulation and encourage incentive anabel   ID eval with wbc fevers and change lines over 5 days old, dc etienne as early as possible , abx as needed  Hem, hh monitoring and golas over 7/22  GI bm eval and monitoring  DVT SUP prophymalis per protocol      My plan includes :  close hemodynamic, ventilatory and drain monitoring and management per post op routine    Monitor for arrhythmias and monitor parameters for organ perfusion  beta blockade not administered due to hemodynamic instability and bradycardia  monitor neurologic status  Head of the bed should remain elevated to 45 deg .   chest PT and IS will be encouraged  monitor adequacy of oxygenation and ventilation and attempt to wean oxygen  antibiotic regimen will be tailored to the clinical, laboratory and microbiologic data  Nutritional goals will be met using po eventually , ensure adequate caloric intake and montior the same  Stress ulcer and VTE prophylaxis will be achieved    Glycemic control is satisfactory  Electrolytes have been repleted as necessary and wound care has been carried out. Pain control has been achieved.   agressive physical therapy and early mobility and ambulation goals will be met   The family was updated about the course and plan  CRITICAL CARE TIME personally provided by me  in evaluation and management, reassessments, review and interpretation of labs and x-rays, ventilator and hemodynamic management, formulating a plan and coordinating care: ___60____ MIN.  Time does not include procedural time. Time spent was non routine post-operarive caRE and included multiple and repeated evaluations at the bedside  CTICU ATTENDING     					    Nael Gates MD

## 2022-04-19 LAB
ALBUMIN SERPL ELPH-MCNC: 4.1 G/DL — SIGNIFICANT CHANGE UP (ref 3.3–5)
ALBUMIN SERPL ELPH-MCNC: 4.4 G/DL — SIGNIFICANT CHANGE UP (ref 3.3–5)
ALP SERPL-CCNC: 57 U/L — SIGNIFICANT CHANGE UP (ref 40–120)
ALP SERPL-CCNC: 69 U/L — SIGNIFICANT CHANGE UP (ref 40–120)
ALT FLD-CCNC: 19 U/L — SIGNIFICANT CHANGE UP (ref 10–45)
ALT FLD-CCNC: 23 U/L — SIGNIFICANT CHANGE UP (ref 10–45)
ANION GAP SERPL CALC-SCNC: 10 MMOL/L — SIGNIFICANT CHANGE UP (ref 5–17)
ANION GAP SERPL CALC-SCNC: 11 MMOL/L — SIGNIFICANT CHANGE UP (ref 5–17)
APTT BLD: 30.3 SEC — SIGNIFICANT CHANGE UP (ref 27.5–35.5)
APTT BLD: 31 SEC — SIGNIFICANT CHANGE UP (ref 27.5–35.5)
AST SERPL-CCNC: 32 U/L — SIGNIFICANT CHANGE UP (ref 10–40)
AST SERPL-CCNC: 38 U/L — SIGNIFICANT CHANGE UP (ref 10–40)
BILIRUB SERPL-MCNC: 0.4 MG/DL — SIGNIFICANT CHANGE UP (ref 0.2–1.2)
BILIRUB SERPL-MCNC: 0.5 MG/DL — SIGNIFICANT CHANGE UP (ref 0.2–1.2)
BUN SERPL-MCNC: 11 MG/DL — SIGNIFICANT CHANGE UP (ref 7–23)
BUN SERPL-MCNC: 7 MG/DL — SIGNIFICANT CHANGE UP (ref 7–23)
CALCIUM SERPL-MCNC: 8.8 MG/DL — SIGNIFICANT CHANGE UP (ref 8.4–10.5)
CALCIUM SERPL-MCNC: 8.9 MG/DL — SIGNIFICANT CHANGE UP (ref 8.4–10.5)
CHLORIDE SERPL-SCNC: 101 MMOL/L — SIGNIFICANT CHANGE UP (ref 96–108)
CHLORIDE SERPL-SCNC: 102 MMOL/L — SIGNIFICANT CHANGE UP (ref 96–108)
CO2 SERPL-SCNC: 23 MMOL/L — SIGNIFICANT CHANGE UP (ref 22–31)
CO2 SERPL-SCNC: 23 MMOL/L — SIGNIFICANT CHANGE UP (ref 22–31)
CREAT SERPL-MCNC: 0.49 MG/DL — LOW (ref 0.5–1.3)
CREAT SERPL-MCNC: 0.63 MG/DL — SIGNIFICANT CHANGE UP (ref 0.5–1.3)
EGFR: 101 ML/MIN/1.73M2 — SIGNIFICANT CHANGE UP
EGFR: 107 ML/MIN/1.73M2 — SIGNIFICANT CHANGE UP
GAS PNL BLDA: SIGNIFICANT CHANGE UP
GAS PNL BLDA: SIGNIFICANT CHANGE UP
GLUCOSE BLDC GLUCOMTR-MCNC: 104 MG/DL — HIGH (ref 70–99)
GLUCOSE BLDC GLUCOMTR-MCNC: 104 MG/DL — HIGH (ref 70–99)
GLUCOSE BLDC GLUCOMTR-MCNC: 112 MG/DL — HIGH (ref 70–99)
GLUCOSE BLDC GLUCOMTR-MCNC: 126 MG/DL — HIGH (ref 70–99)
GLUCOSE SERPL-MCNC: 108 MG/DL — HIGH (ref 70–99)
GLUCOSE SERPL-MCNC: 117 MG/DL — HIGH (ref 70–99)
HCT VFR BLD CALC: 31.2 % — LOW (ref 34.5–45)
HCT VFR BLD CALC: 33.7 % — LOW (ref 34.5–45)
HGB BLD-MCNC: 10.3 G/DL — LOW (ref 11.5–15.5)
HGB BLD-MCNC: 11.4 G/DL — LOW (ref 11.5–15.5)
INR BLD: 1.17 — HIGH (ref 0.88–1.16)
INR BLD: 1.19 — HIGH (ref 0.88–1.16)
MAGNESIUM SERPL-MCNC: 2.1 MG/DL — SIGNIFICANT CHANGE UP (ref 1.6–2.6)
MAGNESIUM SERPL-MCNC: 2.1 MG/DL — SIGNIFICANT CHANGE UP (ref 1.6–2.6)
MCHC RBC-ENTMCNC: 26.8 PG — LOW (ref 27–34)
MCHC RBC-ENTMCNC: 27.7 PG — SIGNIFICANT CHANGE UP (ref 27–34)
MCHC RBC-ENTMCNC: 33 GM/DL — SIGNIFICANT CHANGE UP (ref 32–36)
MCHC RBC-ENTMCNC: 33.8 GM/DL — SIGNIFICANT CHANGE UP (ref 32–36)
MCV RBC AUTO: 81.3 FL — SIGNIFICANT CHANGE UP (ref 80–100)
MCV RBC AUTO: 82 FL — SIGNIFICANT CHANGE UP (ref 80–100)
NRBC # BLD: 0 /100 WBCS — SIGNIFICANT CHANGE UP (ref 0–0)
NRBC # BLD: 0 /100 WBCS — SIGNIFICANT CHANGE UP (ref 0–0)
PHOSPHATE SERPL-MCNC: 3.4 MG/DL — SIGNIFICANT CHANGE UP (ref 2.5–4.5)
PHOSPHATE SERPL-MCNC: 3.7 MG/DL — SIGNIFICANT CHANGE UP (ref 2.5–4.5)
PLATELET # BLD AUTO: 163 K/UL — SIGNIFICANT CHANGE UP (ref 150–400)
PLATELET # BLD AUTO: 167 K/UL — SIGNIFICANT CHANGE UP (ref 150–400)
POTASSIUM SERPL-MCNC: 3.8 MMOL/L — SIGNIFICANT CHANGE UP (ref 3.5–5.3)
POTASSIUM SERPL-MCNC: 4.1 MMOL/L — SIGNIFICANT CHANGE UP (ref 3.5–5.3)
POTASSIUM SERPL-SCNC: 3.8 MMOL/L — SIGNIFICANT CHANGE UP (ref 3.5–5.3)
POTASSIUM SERPL-SCNC: 4.1 MMOL/L — SIGNIFICANT CHANGE UP (ref 3.5–5.3)
PROT SERPL-MCNC: 6.3 G/DL — SIGNIFICANT CHANGE UP (ref 6–8.3)
PROT SERPL-MCNC: 6.4 G/DL — SIGNIFICANT CHANGE UP (ref 6–8.3)
PROTHROM AB SERPL-ACNC: 13.9 SEC — HIGH (ref 10.5–13.4)
PROTHROM AB SERPL-ACNC: 14.2 SEC — HIGH (ref 10.5–13.4)
RBC # BLD: 3.84 M/UL — SIGNIFICANT CHANGE UP (ref 3.8–5.2)
RBC # BLD: 4.11 M/UL — SIGNIFICANT CHANGE UP (ref 3.8–5.2)
RBC # FLD: 14.9 % — HIGH (ref 10.3–14.5)
RBC # FLD: 15.2 % — HIGH (ref 10.3–14.5)
SODIUM SERPL-SCNC: 135 MMOL/L — SIGNIFICANT CHANGE UP (ref 135–145)
SODIUM SERPL-SCNC: 135 MMOL/L — SIGNIFICANT CHANGE UP (ref 135–145)
WBC # BLD: 12.34 K/UL — HIGH (ref 3.8–10.5)
WBC # BLD: 14.03 K/UL — HIGH (ref 3.8–10.5)
WBC # FLD AUTO: 12.34 K/UL — HIGH (ref 3.8–10.5)
WBC # FLD AUTO: 14.03 K/UL — HIGH (ref 3.8–10.5)

## 2022-04-19 PROCEDURE — 99232 SBSQ HOSP IP/OBS MODERATE 35: CPT

## 2022-04-19 PROCEDURE — 93010 ELECTROCARDIOGRAM REPORT: CPT

## 2022-04-19 PROCEDURE — 99291 CRITICAL CARE FIRST HOUR: CPT

## 2022-04-19 PROCEDURE — 71045 X-RAY EXAM CHEST 1 VIEW: CPT | Mod: 26,76

## 2022-04-19 RX ORDER — HEPARIN SODIUM 5000 [USP'U]/ML
5000 INJECTION INTRAVENOUS; SUBCUTANEOUS EVERY 12 HOURS
Refills: 0 | Status: DISCONTINUED | OUTPATIENT
Start: 2022-04-19 | End: 2022-04-21

## 2022-04-19 RX ORDER — ALBUMIN HUMAN 25 %
250 VIAL (ML) INTRAVENOUS
Refills: 0 | Status: DISCONTINUED | OUTPATIENT
Start: 2022-04-19 | End: 2022-04-21

## 2022-04-19 RX ORDER — METOPROLOL TARTRATE 50 MG
12.5 TABLET ORAL EVERY 6 HOURS
Refills: 0 | Status: DISCONTINUED | OUTPATIENT
Start: 2022-04-19 | End: 2022-04-20

## 2022-04-19 RX ORDER — METOCLOPRAMIDE HCL 10 MG
10 TABLET ORAL ONCE
Refills: 0 | Status: COMPLETED | OUTPATIENT
Start: 2022-04-19 | End: 2022-04-21

## 2022-04-19 RX ORDER — SODIUM CHLORIDE 9 MG/ML
3 INJECTION INTRAMUSCULAR; INTRAVENOUS; SUBCUTANEOUS EVERY 8 HOURS
Refills: 0 | Status: DISCONTINUED | OUTPATIENT
Start: 2022-04-19 | End: 2022-04-21

## 2022-04-19 RX ORDER — ALBUMIN HUMAN 25 %
250 VIAL (ML) INTRAVENOUS
Refills: 0 | Status: COMPLETED | OUTPATIENT
Start: 2022-04-19 | End: 2022-04-19

## 2022-04-19 RX ORDER — POTASSIUM CHLORIDE 20 MEQ
20 PACKET (EA) ORAL ONCE
Refills: 0 | Status: COMPLETED | OUTPATIENT
Start: 2022-04-19 | End: 2022-04-19

## 2022-04-19 RX ADMIN — Medication 12.5 MILLIGRAM(S): at 06:17

## 2022-04-19 RX ADMIN — OXYCODONE HYDROCHLORIDE 10 MILLIGRAM(S): 5 TABLET ORAL at 23:30

## 2022-04-19 RX ADMIN — HEPARIN SODIUM 5000 UNIT(S): 5000 INJECTION INTRAVENOUS; SUBCUTANEOUS at 06:17

## 2022-04-19 RX ADMIN — Medication 88 MICROGRAM(S): at 05:18

## 2022-04-19 RX ADMIN — LIDOCAINE 1 PATCH: 4 CREAM TOPICAL at 18:26

## 2022-04-19 RX ADMIN — Medication 100 MILLIGRAM(S): at 07:16

## 2022-04-19 RX ADMIN — OXYCODONE HYDROCHLORIDE 5 MILLIGRAM(S): 5 TABLET ORAL at 15:17

## 2022-04-19 RX ADMIN — Medication 650 MILLIGRAM(S): at 04:30

## 2022-04-19 RX ADMIN — Medication 125 MILLILITER(S): at 14:00

## 2022-04-19 RX ADMIN — CHLORHEXIDINE GLUCONATE 1 APPLICATION(S): 213 SOLUTION TOPICAL at 05:18

## 2022-04-19 RX ADMIN — OXYCODONE HYDROCHLORIDE 5 MILLIGRAM(S): 5 TABLET ORAL at 20:59

## 2022-04-19 RX ADMIN — Medication 650 MILLIGRAM(S): at 20:59

## 2022-04-19 RX ADMIN — Medication 81 MILLIGRAM(S): at 11:51

## 2022-04-19 RX ADMIN — PANTOPRAZOLE SODIUM 40 MILLIGRAM(S): 20 TABLET, DELAYED RELEASE ORAL at 06:17

## 2022-04-19 RX ADMIN — Medication 12.5 MILLIGRAM(S): at 14:27

## 2022-04-19 RX ADMIN — Medication 650 MILLIGRAM(S): at 03:40

## 2022-04-19 RX ADMIN — HEPARIN SODIUM 5000 UNIT(S): 5000 INJECTION INTRAVENOUS; SUBCUTANEOUS at 18:29

## 2022-04-19 RX ADMIN — OXYCODONE HYDROCHLORIDE 10 MILLIGRAM(S): 5 TABLET ORAL at 05:20

## 2022-04-19 RX ADMIN — Medication 125 MILLILITER(S): at 14:21

## 2022-04-19 RX ADMIN — OXYCODONE HYDROCHLORIDE 10 MILLIGRAM(S): 5 TABLET ORAL at 17:45

## 2022-04-19 RX ADMIN — OXYCODONE HYDROCHLORIDE 10 MILLIGRAM(S): 5 TABLET ORAL at 00:00

## 2022-04-19 RX ADMIN — SODIUM CHLORIDE 3 MILLILITER(S): 9 INJECTION INTRAMUSCULAR; INTRAVENOUS; SUBCUTANEOUS at 21:57

## 2022-04-19 RX ADMIN — LIDOCAINE 1 PATCH: 4 CREAM TOPICAL at 11:51

## 2022-04-19 RX ADMIN — Medication 650 MILLIGRAM(S): at 22:00

## 2022-04-19 RX ADMIN — Medication 100 MILLIGRAM(S): at 16:52

## 2022-04-19 RX ADMIN — Medication 1 MILLIGRAM(S): at 11:51

## 2022-04-19 RX ADMIN — OXYCODONE HYDROCHLORIDE 5 MILLIGRAM(S): 5 TABLET ORAL at 22:00

## 2022-04-19 RX ADMIN — SODIUM CHLORIDE 3 MILLILITER(S): 9 INJECTION INTRAMUSCULAR; INTRAVENOUS; SUBCUTANEOUS at 14:25

## 2022-04-19 RX ADMIN — Medication 12.5 MILLIGRAM(S): at 18:29

## 2022-04-19 RX ADMIN — POLYETHYLENE GLYCOL 3350 17 GRAM(S): 17 POWDER, FOR SOLUTION ORAL at 11:51

## 2022-04-19 RX ADMIN — Medication 100 MILLIEQUIVALENT(S): at 12:30

## 2022-04-19 RX ADMIN — OXYCODONE HYDROCHLORIDE 10 MILLIGRAM(S): 5 TABLET ORAL at 18:26

## 2022-04-19 RX ADMIN — SIMVASTATIN 10 MILLIGRAM(S): 20 TABLET, FILM COATED ORAL at 22:40

## 2022-04-19 RX ADMIN — OXYCODONE HYDROCHLORIDE 10 MILLIGRAM(S): 5 TABLET ORAL at 06:00

## 2022-04-19 RX ADMIN — LIDOCAINE 1 PATCH: 4 CREAM TOPICAL at 06:11

## 2022-04-19 RX ADMIN — OXYCODONE HYDROCHLORIDE 5 MILLIGRAM(S): 5 TABLET ORAL at 09:38

## 2022-04-19 NOTE — PROGRESS NOTE ADULT - SUBJECTIVE AND OBJECTIVE BOX
CTICU  CRITICAL  CARE  attending     Hand off received 					   Pertinent clinical, laboratory, radiographic, hemodynamic, echocardiographic, respiratory data, microbiologic data and chart were reviewed and analyzed frequently throughout the course of the day and night        61 years female with HTN, HLD, hypothyroidism, aortic and mitral valve insufficiency.   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 NYHA 3.  She follow up by Dr. Nguyen every 6 months with serial echocardiograms until covid pandemic in 2020.  In October 2021 she had echo that revealed worsening degree of mitral valve insufficiency.   She denies c/o chest pain or shortness of breath at rest, fever, lower extremity edema, PND, orthopnea, syncope or palpitations.   3/7/22 Cardiac cath performed at [facility] revealed severe MR, no coronary artery disease.   3/14/22 SANDRA revealed severe MR, normal LV function.  S/P Mitral valve repair.        FAMILY HISTORY:  No pertinent family history in first degree relatives    PAST MEDICAL & SURGICAL HISTORY:  HTN (hypertension)  HLD (hyperlipidemia)  Hypothyroidism  Pulmonary HTN  H/O cervical spine surgery  H/O lumbosacral spine surgery          14 system review was unremarkable    Vital signs, hemodynamic and respiratory parameters were reviewed from the bedside nursing flow sheet.  ICU Vital Signs Last 24 Hrs  T(C): 37 (19 Apr 2022 17:31), Max: 37.4 (19 Apr 2022 05:01)  T(F): 98.6 (19 Apr 2022 17:31), Max: 99.4 (19 Apr 2022 05:01)  HR: 85 (19 Apr 2022 16:10) (66 - 85)  BP: 135/61 (19 Apr 2022 16:10) (122/58 - 135/61)  BP(mean): 88 (19 Apr 2022 16:10) (84 - 88)  ABP: 98/46 (19 Apr 2022 13:00) (98/46 - 142/61)  ABP(mean): 65 (19 Apr 2022 13:00) (65 - 91)  RR: 18 (19 Apr 2022 16:10) (15 - 20)  SpO2: 98% (19 Apr 2022 16:10) (96% - 100%)    Adult Advanced Hemodynamics Last 24 Hrs  CVP(mm Hg): -1 (19 Apr 2022 13:00) (-2 - 9)  CVP(cm H2O): --  CO: --  CI: --  PA: --  PA(mean): --  PCWP: --  SVR: --  SVRI: --  PVR: --  PVRI: --, ABG - ( 19 Apr 2022 10:41 )  pH, Arterial: 7.37  pH, Blood: x     /  pCO2: 39    /  pO2: 79    / HCO3: 22    / Base Excess: -2.5  /  SaO2: 97.0                Intake and output was reviewed and the fluid balance was calculated  Daily     Daily   I&O's Summary    18 Apr 2022 07:01  -  19 Apr 2022 07:00  --------------------------------------------------------  IN: 2627 mL / OUT: 4035 mL / NET: -1408 mL    19 Apr 2022 07:01  -  19 Apr 2022 17:49  --------------------------------------------------------  IN: 1310 mL / OUT: 385 mL / NET: 925 mL        All lines and drain sites were assessed      Neuro: No postoperative focal motor deficit.  Neck: No JVD.  CVS: S1, S2, No S3.  Lungs: Good air entry bilaterally.  Abd: Soft. No tenderness. + Bowel sounds.  Vascular: + DP/PT.  Extremities: No edema.  Lymphatic: Normal.  Skin: No abnormalities.      labs  CBC Full  -  ( 19 Apr 2022 10:43 )  WBC Count : 12.34 K/uL  RBC Count : 3.84 M/uL  Hemoglobin : 10.3 g/dL  Hematocrit : 31.2 %  Platelet Count - Automated : 167 K/uL  Mean Cell Volume : 81.3 fl  Mean Cell Hemoglobin : 26.8 pg  Mean Cell Hemoglobin Concentration : 33.0 gm/dL  Auto Neutrophil # : x  Auto Lymphocyte # : x  Auto Monocyte # : x  Auto Eosinophil # : x  Auto Basophil # : x  Auto Neutrophil % : x  Auto Lymphocyte % : x  Auto Monocyte % : x  Auto Eosinophil % : x  Auto Basophil % : x    04-19    135  |  101  |  11  ----------------------------<  108<H>  3.8   |  23  |  0.63    Ca    8.8      19 Apr 2022 10:43  Phos  3.7     04-19  Mg     2.1     04-19    TPro  6.4  /  Alb  4.4  /  TBili  0.5  /  DBili  x   /  AST  32  /  ALT  19  /  AlkPhos  57  04-19    PT/INR - ( 19 Apr 2022 10:44 )   PT: 14.2 sec;   INR: 1.19          PTT - ( 19 Apr 2022 10:44 )  PTT:30.3 sec  The current medications were reviewed   MEDICATIONS  (STANDING):  albumin human  5% IVPB 250 milliLiter(s) IV Intermittent every 30 minutes  aspirin enteric coated 81 milliGRAM(s) Oral daily  ceFAZolin   IVPB 2000 milliGRAM(s) IV Intermittent every 8 hours  folic acid 1 milliGRAM(s) Oral daily  glucagon  Injectable 1 milliGRAM(s) IntraMuscular once  heparin   Injectable 5000 Unit(s) SubCutaneous every 12 hours  insulin lispro (ADMELOG) corrective regimen sliding scale   SubCutaneous Before meals and at bedtime  levothyroxine 88 MICROGram(s) Oral daily  lidocaine   4% Patch 1 Patch Transdermal daily  metoclopramide Injectable 10 milliGRAM(s) IV Push once  metoprolol tartrate 12.5 milliGRAM(s) Oral every 6 hours  pantoprazole    Tablet 40 milliGRAM(s) Oral before breakfast  polyethylene glycol 3350 17 Gram(s) Oral daily  simvastatin 10 milliGRAM(s) Oral at bedtime  sodium chloride 0.9% lock flush 3 milliLiter(s) IV Push every 8 hours    MEDICATIONS  (PRN):  acetaminophen     Tablet .. 650 milliGRAM(s) Oral every 6 hours PRN Temp greater or equal to 38C (100.4F), Mild Pain (1 - 3)  oxyCODONE    IR 5 milliGRAM(s) Oral every 6 hours PRN Moderate Pain (4 - 6)  oxyCODONE    IR 10 milliGRAM(s) Oral every 6 hours PRN Severe Pain (7 - 10)            61y old  Female with severe Mitral Valve Regurgitation.  S/P Mitral valve repair  S/P Mitral valve ring annuloplasty.  Hemodynamically stable.  Good oxygenation.  Fair urine out put.        My plan includes :  Gentle Diuresis.  Statin and Betablocker.  Thyroid Replacement Rx.  Close hemodynamic, ventilatory and drain monitoring and management  Monitor for arrhythmias and monitor parameters for organ perfusion  Monitor neurologic status  Monitor renal function.  Head of the bed should remain elevated to 45 deg .   Chest PT and IS will be encouraged  Monitor adequacy of oxygenation and ventilation and attempt to wean oxygen  Nutritional goals will be met using po eventually , ensure adequate caloric intake and monitor the same  Stress ulcer and VTE prophylaxis will be achieved    Glycemic control is satisfactory  Electrolytes have been repleted as necessary and wound care has been carried out. Pain control has been achieved.   Aggressive physical therapy and early mobility and ambulation goals will be met   The family was updated about the course and plan  CRITICAL CARE TIME SPENT in evaluation and management, reassessments, review and interpretation of labs and x-rays, ventilator and hemodynamic management, formulating a plan and coordinating care: ___30____ MIN.  Time does not include procedural time.  CTICU ATTENDING     					    Serjio Victoria MD

## 2022-04-19 NOTE — PHYSICAL THERAPY INITIAL EVALUATION ADULT - DISCHARGE PLANNER MADE AWARE
- paracentesis 10/1, 5L removed, no fluid sent for analysis.  - diuresing with albumin and lasix 10/9      yes

## 2022-04-19 NOTE — PROGRESS NOTE ADULT - SUBJECTIVE AND OBJECTIVE BOX
TAHMINA ALVAREZ  MRN-[id number]  Patient is a 61y old  Female who presents with a chief complaint of Mitral Valve Regurgitation (18 Apr 2022 21:23)    HPI:  62 yo female presents with PMH of HTN, HLD,hypothyroidism, aortic and mitral valve insufficiency. She is referred by Dr. Nguyen. She reports follow up by Dr. Nguyen every 6 months with serial echocardiograms until covid pandemic in 2020. In October 2021 she had echo that revealed worsening degree of mitral valve insufficiency. 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 NYHA 3. She denies c/o chest pain or shortness of breath at rest, fever, lower extremity edema, PND, orthopnea, syncope or palpitations. 3/7/22 Cardiac cath performed at [facility] revealed severe MR, no coronary artery disease. 3/14/22 [facility] revealed severe MR, normal LV function.     She was seen in the outpatient office by Dr. Faulkner and now presents for elective surgery.    (14 Apr 2022 14:21)      Surgery/Hospital course:    Today:    REVIEW OF SYSTEMS:  Gen: No fever  EYES/ENT: No visual changes;  No vertigo or throat pain   NECK: No pain   RES:  No shortness of breath or Cough  Chest: + incisional pain  CARD: No chest pain   GI: No abdominal pain  : No dysuria  NEURO: No weakness  SKIN: No itching, rashes     Physical Exam:  Vital Signs Last 24 Hrs  T(C): 36.8 (19 Apr 2022 14:00), Max: 37.4 (19 Apr 2022 05:01)  T(F): 98.3 (19 Apr 2022 14:00), Max: 99.4 (19 Apr 2022 05:01)  HR: 77 (19 Apr 2022 12:00) (66 - 90)  BP: 122/58 (18 Apr 2022 21:00) (122/58 - 122/58)  BP(mean): 84 (18 Apr 2022 21:00) (84 - 84)  RR: 15 (19 Apr 2022 12:00) (13 - 21)  SpO2: 98% (19 Apr 2022 12:00) (96% - 100%)  Gen:  Awake, alert   CNS: non focal 	  Neck: no JVD  RES : clear , no wheezing    Chest:   + chest tubes                     CVS: Regular  rhythm. Normal S1/S2  Abd: Soft, non-distended. Bowel sounds present.  Skin: No rash.  Ext:  no edema, A Line  PSY:  ============================I/O===========================   I&O's Detail    18 Apr 2022 07:01  -  19 Apr 2022 07:00  --------------------------------------------------------  IN:    Albumin 5%  - 250 mL: 500 mL    Dexmedetomidine: 96 mL    IV PiggyBack: 750 mL    Lactated Ringers Bolus: 500 mL    NiCARdipine: 330 mL    Oral Fluid: 120 mL    Propofol: 11 mL    sodium chloride 0.9%: 320 mL  Total IN: 2627 mL    OUT:    Bulb (mL): 250 mL    Bulb (mL): 100 mL    Chest Tube (mL): 70 mL    Indwelling Catheter - Urethral (mL): 3615 mL  Total OUT: 4035 mL    Total NET: -1408 mL      19 Apr 2022 07:01  -  19 Apr 2022 14:17  --------------------------------------------------------  IN:    Albumin 5%  - 250 mL: 500 mL    IV PiggyBack: 100 mL    Oral Fluid: 60 mL    sodium chloride 0.9%: 50 mL  Total IN: 710 mL    OUT:    Bulb (mL): 0 mL    Bulb (mL): 0 mL    Chest Tube (mL): 5 mL    Indwelling Catheter - Urethral (mL): 105 mL  Total OUT: 110 mL    Total NET: 600 mL        ============================ LABS =========================                        10.3   12.34 )-----------( 167      ( 19 Apr 2022 10:43 )             31.2     04-19    135  |  101  |  11  ----------------------------<  108<H>  3.8   |  23  |  0.63    Ca    8.8      19 Apr 2022 10:43  Phos  3.7     04-19  Mg     2.1     04-19    TPro  6.4  /  Alb  4.4  /  TBili  0.5  /  DBili  x   /  AST  32  /  ALT  19  /  AlkPhos  57  04-19    LIVER FUNCTIONS - ( 19 Apr 2022 10:43 )  Alb: 4.4 g/dL / Pro: 6.4 g/dL / ALK PHOS: 57 U/L / ALT: 19 U/L / AST: 32 U/L / GGT: x           PT/INR - ( 19 Apr 2022 10:44 )   PT: 14.2 sec;   INR: 1.19          PTT - ( 19 Apr 2022 10:44 )  PTT:30.3 sec  ABG - ( 19 Apr 2022 10:41 )  pH, Arterial: 7.37  pH, Blood: x     /  pCO2: 39    /  pO2: 79    / HCO3: 22    / Base Excess: -2.5  /  SaO2: 97.0                ======================Micro/Rad/Cardio=================  Culture: Reviewed   CXR: Reviewed  Echo:Reviewed  ======================================================  PAST MEDICAL & SURGICAL HISTORY:  HTN (hypertension)    HLD (hyperlipidemia)    Hypothyroidism    Pulmonary HTN    H/O cervical spine surgery    H/O lumbosacral spine surgery      ====================ASSESMENT ==============  CNS:  RES:  CVS:  Hem:  Stevie:  GI:  Endo:  ID:  Skin  Plan:  ====================== NEUROLOGY=====================  acetaminophen     Tablet .. 650 milliGRAM(s) Oral every 6 hours PRN Temp greater or equal to 38C (100.4F), Mild Pain (1 - 3)  oxyCODONE    IR 5 milliGRAM(s) Oral every 6 hours PRN Moderate Pain (4 - 6)  oxyCODONE    IR 10 milliGRAM(s) Oral every 6 hours PRN Severe Pain (7 - 10)    ==================== RESPIRATORY======================  Mechanical Ventilation:      ====================CARDIOVASCULAR==================  metoprolol tartrate 12.5 milliGRAM(s) Oral every 6 hours    ===================HEMATOLOGIC/ONC ===================  aspirin enteric coated 81 milliGRAM(s) Oral daily  heparin   Injectable 5000 Unit(s) SubCutaneous every 12 hours    ===================== RENAL =========================  [place] for monitoring urine output    ==================== GASTROINTESTINAL===================  albumin human  5% IVPB 250 milliLiter(s) IV Intermittent every 30 minutes  albumin human  5% IVPB 250 milliLiter(s) IV Intermittent every 30 minutes  folic acid 1 milliGRAM(s) Oral daily  pantoprazole    Tablet 40 milliGRAM(s) Oral before breakfast  polyethylene glycol 3350 17 Gram(s) Oral daily  potassium chloride  20 mEq/100 mL IVPB 20 milliEquivalent(s) IV Intermittent once  sodium chloride 0.9% lock flush 3 milliLiter(s) IV Push every 8 hours    =======================    ENDOCRINE  =====================  glucagon  Injectable 1 milliGRAM(s) IntraMuscular once  insulin lispro (ADMELOG) corrective regimen sliding scale   SubCutaneous Before meals and at bedtime  levothyroxine 88 MICROGram(s) Oral daily  simvastatin 10 milliGRAM(s) Oral at bedtime    ========================INFECTIOUS DISEASE================  ceFAZolin   IVPB 2000 milliGRAM(s) IV Intermittent every 8 hours    .crit           TAHMINA ALVAREZ  [id number]  Patient is a 61y old  Female who presents with a chief complaint of Mitral Valve Regurgitation (18 Apr 2022 21:23)    HPI:  62 yo female presents with PMH of HTN, HLD,hypothyroidism, aortic and mitral valve insufficiency. She is referred by Dr. Nguyen. She reports follow up by Dr. Nguyen every 6 months with serial echocardiograms until covid pandemic in 2020. In October 2021 she had echo that revealed worsening degree of mitral valve insufficiency. 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 NYHA 3. She denies c/o chest pain or shortness of breath at rest, fever, lower extremity edema, PND, orthopnea, syncope or palpitations. 3/7/22 Cardiac cath performed at [facility] revealed severe MR, no coronary artery disease. 3/14/22 [facility] revealed severe MR, normal LV function.     She was seen in the outpatient office by Dr. Faulkner and now presents for elective surgery.    (14 Apr 2022 14:21)      Surgery/Hospital course:   04/18/2022 mitral valve repair    Today:   the line, D/C chest tube D/C chest wall drainage patient remain with pericardial drain    REVIEW OF SYSTEMS:  Gen: No fever  EYES/ENT: No visual changes;  No vertigo or throat pain   NECK: No pain   RES:  No shortness of breath or Cough  Chest: + incisional pain  CARD: No chest pain   GI: No abdominal pain  : No dysuria  NEURO: No weakness  SKIN: No itching, rashes     Physical Exam:  Vital Signs Last 24 Hrs  T(C): 36.8 (19 Apr 2022 14:00), Max: 37.4 (19 Apr 2022 05:01)  T(F): 98.3 (19 Apr 2022 14:00), Max: 99.4 (19 Apr 2022 05:01)  HR: 77 (19 Apr 2022 12:00) (66 - 90)  BP: 122/58 (18 Apr 2022 21:00) (122/58 - 122/58)  BP(mean): 84 (18 Apr 2022 21:00) (84 - 84)  RR: 15 (19 Apr 2022 12:00) (13 - 21)  SpO2: 98% (19 Apr 2022 12:00) (96% - 100%)  Gen:  Awake, alert   CNS: non focal 	  Neck: no JVD  RES : clear , no wheezing    Chest:   + chest tubes                     CVS: Regular  rhythm. Normal S1/S2  Abd: Soft, non-distended. Bowel sounds present.  Skin: No rash.  Ext:  no edema, A Line  PSY: appropriate affect  ============================I/O===========================   I&O's Detail    18 Apr 2022 07:01  -  19 Apr 2022 07:00  --------------------------------------------------------  IN:    Albumin 5%  - 250 mL: 500 mL    Dexmedetomidine: 96 mL    IV PiggyBack: 750 mL    Lactated Ringers Bolus: 500 mL    NiCARdipine: 330 mL    Oral Fluid: 120 mL    Propofol: 11 mL    sodium chloride 0.9%: 320 mL  Total IN: 2627 mL    OUT:    Bulb (mL): 250 mL    Bulb (mL): 100 mL    Chest Tube (mL): 70 mL    Indwelling Catheter - Urethral (mL): 3615 mL  Total OUT: 4035 mL    Total NET: -1408 mL      19 Apr 2022 07:01  -  19 Apr 2022 14:17  --------------------------------------------------------  IN:    Albumin 5%  - 250 mL: 500 mL    IV PiggyBack: 100 mL    Oral Fluid: 60 mL    sodium chloride 0.9%: 50 mL  Total IN: 710 mL    OUT:    Bulb (mL): 0 mL    Bulb (mL): 0 mL    Chest Tube (mL): 5 mL    Indwelling Catheter - Urethral (mL): 105 mL  Total OUT: 110 mL    Total NET: 600 mL        ============================ LABS =========================                        10.3   12.34 )-----------( 167      ( 19 Apr 2022 10:43 )             31.2     04-19    135  |  101  |  11  ----------------------------<  108<H>  3.8   |  23  |  0.63    Ca    8.8      19 Apr 2022 10:43  Phos  3.7     04-19  Mg     2.1     04-19    TPro  6.4  /  Alb  4.4  /  TBili  0.5  /  DBili  x   /  AST  32  /  ALT  19  /  AlkPhos  57  04-19    LIVER FUNCTIONS - ( 19 Apr 2022 10:43 )  Alb: 4.4 g/dL / Pro: 6.4 g/dL / ALK PHOS: 57 U/L / ALT: 19 U/L / AST: 32 U/L / GGT: x           PT/INR - ( 19 Apr 2022 10:44 )   PT: 14.2 sec;   INR: 1.19          PTT - ( 19 Apr 2022 10:44 )  PTT:30.3 sec  ABG - ( 19 Apr 2022 10:41 )  pH, Arterial: 7.37  pH, Blood: x     /  pCO2: 39    /  pO2: 79    / HCO3: 22    / Base Excess: -2.5  /  SaO2: 97.0                ======================Micro/Rad/Cardio=================  Culture: Reviewed   CXR: Reviewed  Echo:Reviewed  ======================================================  PAST MEDICAL & SURGICAL HISTORY:  HTN (hypertension)    HLD (hyperlipidemia)    Hypothyroidism    Pulmonary HTN    H/O cervical spine surgery    H/O lumbosacral spine surgery      ====================ASSESMENT ==============   04/18/2022 mitral valve repair   Anemia blood loss  Hypertension   Hyperlipidemia  Hypothyroidism   Pulmonary hypertension    pan:  ====================== NEUROLOGY=====================  acetaminophen     Tablet .. 650 milliGRAM(s) Oral every 6 hours PRN Temp greater or equal to 38C (100.4F), Mild Pain (1 - 3)  oxyCODONE    IR 5 milliGRAM(s) Oral every 6 hours PRN Moderate Pain (4 - 6)  oxyCODONE    IR 10 milliGRAM(s) Oral every 6 hours PRN Severe Pain (7 - 10)    ==================== RESPIRATORY======================   D/C oxygen supplement  Breathing comfortable on room air  ====================CARDIOVASCULAR==================  metoprolol tartrate 12.5 milliGRAM(s) Oral every 6 hours    ===================HEMATOLOGIC/ONC ===================  aspirin enteric coated 81 milliGRAM(s) Oral daily  heparin   Injectable 5000 Unit(s) SubCutaneous every 12 hours    ===================== RENAL =========================  [place] for monitoring urine output    ==================== GASTROINTESTINAL===================  albumin human  5% IVPB 250 milliLiter(s) IV Intermittent every 30 minutes  albumin human  5% IVPB 250 milliLiter(s) IV Intermittent every 30 minutes  folic acid 1 milliGRAM(s) Oral daily  pantoprazole    Tablet 40 milliGRAM(s) Oral before breakfast  polyethylene glycol 3350 17 Gram(s) Oral daily  potassium chloride  20 mEq/100 mL IVPB 20 milliEquivalent(s) IV Intermittent once  sodium chloride 0.9% lock flush 3 milliLiter(s) IV Push every 8 hours    =======================    ENDOCRINE  =====================  glucagon  Injectable 1 milliGRAM(s) IntraMuscular once  insulin lispro (ADMELOG) corrective regimen sliding scale   SubCutaneous Before meals and at bedtime  levothyroxine 88 MICROGram(s) Oral daily  simvastatin 10 milliGRAM(s) Oral at bedtime    ========================INFECTIOUS DISEASE================  ceFAZolin   IVPB 2000 milliGRAM(s) IV Intermittent every 8 hours     discussed patient condition on round, transferred to floor         TAHMINA ALVAREZ  [id number]  Patient is a 61y old  Female who presents with a chief complaint of Mitral Valve Regurgitation (18 Apr 2022 21:23)    HPI:  60 yo female presents with PMH of HTN, HLD,hypothyroidism, aortic and mitral valve insufficiency. She is referred by Dr. Nguyen. She reports follow up by Dr. Nguyen every 6 months with serial echocardiograms until covid pandemic in 2020. In October 2021 she had echo that revealed worsening degree of mitral valve insufficiency. 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 NYHA 3. She denies c/o chest pain or shortness of breath at rest, fever, lower extremity edema, PND, orthopnea, syncope or palpitations. 3/7/22 Cardiac cath performed at [facility] revealed severe MR, no coronary artery disease. 3/14/22 [facility] revealed severe MR, normal LV function.     She was seen in the outpatient office by Dr. Faulkner and now presents for elective surgery.    (14 Apr 2022 14:21)      Surgery/Hospital course:   04/18/2022 mitral valve repair    Today:   the line, D/C chest tube D/C chest wall drainage patient remain with pericardial drain    REVIEW OF SYSTEMS:  Gen: No fever  EYES/ENT: No visual changes;  No vertigo or throat pain   NECK: No pain   RES:  No shortness of breath or Cough  Chest: + incisional pain  CARD: No chest pain   GI: No abdominal pain  : No dysuria  NEURO: No weakness  SKIN: No itching, rashes     Physical Exam:  Vital Signs Last 24 Hrs  T(C): 36.8 (19 Apr 2022 14:00), Max: 37.4 (19 Apr 2022 05:01)  T(F): 98.3 (19 Apr 2022 14:00), Max: 99.4 (19 Apr 2022 05:01)  HR: 77 (19 Apr 2022 12:00) (66 - 90)  BP: 122/58 (18 Apr 2022 21:00) (122/58 - 122/58)  BP(mean): 84 (18 Apr 2022 21:00) (84 - 84)  RR: 15 (19 Apr 2022 12:00) (13 - 21)  SpO2: 98% (19 Apr 2022 12:00) (96% - 100%)  Gen:  Awake, alert   CNS: non focal 	  Neck: no JVD  RES : clear , no wheezing    Chest:   + chest tubes                     CVS: Regular  rhythm. Normal S1/S2  Abd: Soft, non-distended. Bowel sounds present.  Skin: No rash.  Ext:  no edema, A Line  PSY: appropriate affect  ============================I/O===========================   I&O's Detail    18 Apr 2022 07:01  -  19 Apr 2022 07:00  --------------------------------------------------------  IN:    Albumin 5%  - 250 mL: 500 mL    Dexmedetomidine: 96 mL    IV PiggyBack: 750 mL    Lactated Ringers Bolus: 500 mL    NiCARdipine: 330 mL    Oral Fluid: 120 mL    Propofol: 11 mL    sodium chloride 0.9%: 320 mL  Total IN: 2627 mL    OUT:    Bulb (mL): 250 mL    Bulb (mL): 100 mL    Chest Tube (mL): 70 mL    Indwelling Catheter - Urethral (mL): 3615 mL  Total OUT: 4035 mL    Total NET: -1408 mL      19 Apr 2022 07:01  -  19 Apr 2022 14:17  --------------------------------------------------------  IN:    Albumin 5%  - 250 mL: 500 mL    IV PiggyBack: 100 mL    Oral Fluid: 60 mL    sodium chloride 0.9%: 50 mL  Total IN: 710 mL    OUT:    Bulb (mL): 0 mL    Bulb (mL): 0 mL    Chest Tube (mL): 5 mL    Indwelling Catheter - Urethral (mL): 105 mL  Total OUT: 110 mL    Total NET: 600 mL        ============================ LABS =========================                        10.3   12.34 )-----------( 167      ( 19 Apr 2022 10:43 )             31.2     04-19    135  |  101  |  11  ----------------------------<  108<H>  3.8   |  23  |  0.63    Ca    8.8      19 Apr 2022 10:43  Phos  3.7     04-19  Mg     2.1     04-19    TPro  6.4  /  Alb  4.4  /  TBili  0.5  /  DBili  x   /  AST  32  /  ALT  19  /  AlkPhos  57  04-19    LIVER FUNCTIONS - ( 19 Apr 2022 10:43 )  Alb: 4.4 g/dL / Pro: 6.4 g/dL / ALK PHOS: 57 U/L / ALT: 19 U/L / AST: 32 U/L / GGT: x           PT/INR - ( 19 Apr 2022 10:44 )   PT: 14.2 sec;   INR: 1.19          PTT - ( 19 Apr 2022 10:44 )  PTT:30.3 sec  ABG - ( 19 Apr 2022 10:41 )  pH, Arterial: 7.37  pH, Blood: x     /  pCO2: 39    /  pO2: 79    / HCO3: 22    / Base Excess: -2.5  /  SaO2: 97.0                ======================Micro/Rad/Cardio=================  Culture: Reviewed   CXR: Reviewed  Echo:Reviewed  ======================================================  PAST MEDICAL & SURGICAL HISTORY:  HTN (hypertension)    HLD (hyperlipidemia)    Hypothyroidism    Pulmonary HTN    H/O cervical spine surgery    H/O lumbosacral spine surgery      ====================ASSESMENT ==============   04/18/2022 mitral valve repair   Anemia blood loss  Oliguria  Hypertension   Hyperlipidemia  Hypothyroidism   Pulmonary hypertension    pan:  ====================== NEUROLOGY=====================  acetaminophen     Tablet .. 650 milliGRAM(s) Oral every 6 hours PRN Temp greater or equal to 38C (100.4F), Mild Pain (1 - 3)  oxyCODONE    IR 5 milliGRAM(s) Oral every 6 hours PRN Moderate Pain (4 - 6)  oxyCODONE    IR 10 milliGRAM(s) Oral every 6 hours PRN Severe Pain (7 - 10)    ==================== RESPIRATORY======================   D/C oxygen supplement  Breathing comfortable on room air  ====================CARDIOVASCULAR==================  metoprolol tartrate 12.5 milliGRAM(s) Oral every 6 hours    ===================HEMATOLOGIC/ONC ===================  aspirin enteric coated 81 milliGRAM(s) Oral daily  heparin   Injectable 5000 Unit(s) SubCutaneous every 12 hours    ===================== RENAL =========================  [place] for monitoring urine output    ==================== GASTROINTESTINAL===================  albumin human  5% IVPB 250 milliLiter(s) IV Intermittent every 30 minutes  albumin human  5% IVPB 250 milliLiter(s) IV Intermittent every 30 minutes  folic acid 1 milliGRAM(s) Oral daily  pantoprazole    Tablet 40 milliGRAM(s) Oral before breakfast  polyethylene glycol 3350 17 Gram(s) Oral daily  potassium chloride  20 mEq/100 mL IVPB 20 milliEquivalent(s) IV Intermittent once  sodium chloride 0.9% lock flush 3 milliLiter(s) IV Push every 8 hours    =======================    ENDOCRINE  =====================  glucagon  Injectable 1 milliGRAM(s) IntraMuscular once  insulin lispro (ADMELOG) corrective regimen sliding scale   SubCutaneous Before meals and at bedtime  levothyroxine 88 MICROGram(s) Oral daily  simvastatin 10 milliGRAM(s) Oral at bedtime    ========================INFECTIOUS DISEASE================  ceFAZolin   IVPB 2000 milliGRAM(s) IV Intermittent every 8 hours     discussed patient condition on round, transferred to floor

## 2022-04-19 NOTE — PHYSICAL THERAPY INITIAL EVALUATION ADULT - PERTINENT HX OF CURRENT PROBLEM, REHAB EVAL
60 yo female presents with PMH of HTN, HLD,hypothyroidism, aortic and mitral valve insufficiency. She is referred by Dr. Nguyen. She reports follow up by Dr. Nguyen every 6 months with serial echocardiograms until covid pandemic in 2020. In October 2021 she had echo that revealed worsening degree of mitral valve insufficiency.

## 2022-04-19 NOTE — PHYSICAL THERAPY INITIAL EVALUATION ADULT - GENERAL OBSERVATIONS, REHAB EVAL
As per FATUMA Suazo patient cleared for PT. Received sitting in chair + tele, temporary pacemaker, central line, A line, yo etienne x 2, CT (off suction for ambulation), in NAD

## 2022-04-20 LAB
ANION GAP SERPL CALC-SCNC: 10 MMOL/L — SIGNIFICANT CHANGE UP (ref 5–17)
BUN SERPL-MCNC: 9 MG/DL — SIGNIFICANT CHANGE UP (ref 7–23)
CALCIUM SERPL-MCNC: 9.4 MG/DL — SIGNIFICANT CHANGE UP (ref 8.4–10.5)
CHLORIDE SERPL-SCNC: 102 MMOL/L — SIGNIFICANT CHANGE UP (ref 96–108)
CO2 SERPL-SCNC: 26 MMOL/L — SIGNIFICANT CHANGE UP (ref 22–31)
CREAT SERPL-MCNC: 0.64 MG/DL — SIGNIFICANT CHANGE UP (ref 0.5–1.3)
EGFR: 100 ML/MIN/1.73M2 — SIGNIFICANT CHANGE UP
GLUCOSE BLDC GLUCOMTR-MCNC: 103 MG/DL — HIGH (ref 70–99)
GLUCOSE BLDC GLUCOMTR-MCNC: 116 MG/DL — HIGH (ref 70–99)
GLUCOSE BLDC GLUCOMTR-MCNC: 149 MG/DL — HIGH (ref 70–99)
GLUCOSE BLDC GLUCOMTR-MCNC: 95 MG/DL — SIGNIFICANT CHANGE UP (ref 70–99)
GLUCOSE SERPL-MCNC: 98 MG/DL — SIGNIFICANT CHANGE UP (ref 70–99)
HCT VFR BLD CALC: 31 % — LOW (ref 34.5–45)
HGB BLD-MCNC: 10.2 G/DL — LOW (ref 11.5–15.5)
MAGNESIUM SERPL-MCNC: 2 MG/DL — SIGNIFICANT CHANGE UP (ref 1.6–2.6)
MCHC RBC-ENTMCNC: 27.4 PG — SIGNIFICANT CHANGE UP (ref 27–34)
MCHC RBC-ENTMCNC: 32.9 GM/DL — SIGNIFICANT CHANGE UP (ref 32–36)
MCV RBC AUTO: 83.3 FL — SIGNIFICANT CHANGE UP (ref 80–100)
NRBC # BLD: 0 /100 WBCS — SIGNIFICANT CHANGE UP (ref 0–0)
PHOSPHATE SERPL-MCNC: 2.8 MG/DL — SIGNIFICANT CHANGE UP (ref 2.5–4.5)
PLATELET # BLD AUTO: 159 K/UL — SIGNIFICANT CHANGE UP (ref 150–400)
POTASSIUM SERPL-MCNC: 4 MMOL/L — SIGNIFICANT CHANGE UP (ref 3.5–5.3)
POTASSIUM SERPL-SCNC: 4 MMOL/L — SIGNIFICANT CHANGE UP (ref 3.5–5.3)
RBC # BLD: 3.72 M/UL — LOW (ref 3.8–5.2)
RBC # FLD: 15.2 % — HIGH (ref 10.3–14.5)
SODIUM SERPL-SCNC: 138 MMOL/L — SIGNIFICANT CHANGE UP (ref 135–145)
WBC # BLD: 11.55 K/UL — HIGH (ref 3.8–10.5)
WBC # FLD AUTO: 11.55 K/UL — HIGH (ref 3.8–10.5)

## 2022-04-20 PROCEDURE — 71045 X-RAY EXAM CHEST 1 VIEW: CPT | Mod: 26,59

## 2022-04-20 PROCEDURE — 71046 X-RAY EXAM CHEST 2 VIEWS: CPT | Mod: 26

## 2022-04-20 RX ORDER — FUROSEMIDE 40 MG
20 TABLET ORAL ONCE
Refills: 0 | Status: COMPLETED | OUTPATIENT
Start: 2022-04-20 | End: 2022-04-20

## 2022-04-20 RX ORDER — SENNA PLUS 8.6 MG/1
2 TABLET ORAL AT BEDTIME
Refills: 0 | Status: DISCONTINUED | OUTPATIENT
Start: 2022-04-20 | End: 2022-04-21

## 2022-04-20 RX ORDER — POTASSIUM CHLORIDE 20 MEQ
10 PACKET (EA) ORAL ONCE
Refills: 0 | Status: COMPLETED | OUTPATIENT
Start: 2022-04-20 | End: 2022-04-20

## 2022-04-20 RX ORDER — METOPROLOL TARTRATE 50 MG
25 TABLET ORAL EVERY 6 HOURS
Refills: 0 | Status: DISCONTINUED | OUTPATIENT
Start: 2022-04-20 | End: 2022-04-21

## 2022-04-20 RX ADMIN — OXYCODONE HYDROCHLORIDE 5 MILLIGRAM(S): 5 TABLET ORAL at 02:43

## 2022-04-20 RX ADMIN — Medication 20 MILLIGRAM(S): at 08:50

## 2022-04-20 RX ADMIN — SENNA PLUS 2 TABLET(S): 8.6 TABLET ORAL at 22:15

## 2022-04-20 RX ADMIN — Medication 88 MICROGRAM(S): at 05:55

## 2022-04-20 RX ADMIN — Medication 81 MILLIGRAM(S): at 12:14

## 2022-04-20 RX ADMIN — POLYETHYLENE GLYCOL 3350 17 GRAM(S): 17 POWDER, FOR SOLUTION ORAL at 12:15

## 2022-04-20 RX ADMIN — LIDOCAINE 1 PATCH: 4 CREAM TOPICAL at 12:15

## 2022-04-20 RX ADMIN — Medication 1 MILLIGRAM(S): at 12:14

## 2022-04-20 RX ADMIN — LIDOCAINE 1 PATCH: 4 CREAM TOPICAL at 19:50

## 2022-04-20 RX ADMIN — Medication 5 MILLIGRAM(S): at 18:15

## 2022-04-20 RX ADMIN — OXYCODONE HYDROCHLORIDE 10 MILLIGRAM(S): 5 TABLET ORAL at 08:35

## 2022-04-20 RX ADMIN — Medication 25 MILLIGRAM(S): at 18:15

## 2022-04-20 RX ADMIN — PANTOPRAZOLE SODIUM 40 MILLIGRAM(S): 20 TABLET, DELAYED RELEASE ORAL at 05:57

## 2022-04-20 RX ADMIN — Medication 100 MILLIGRAM(S): at 00:10

## 2022-04-20 RX ADMIN — OXYCODONE HYDROCHLORIDE 10 MILLIGRAM(S): 5 TABLET ORAL at 09:30

## 2022-04-20 RX ADMIN — OXYCODONE HYDROCHLORIDE 5 MILLIGRAM(S): 5 TABLET ORAL at 08:29

## 2022-04-20 RX ADMIN — Medication 24 MILLIGRAM(S): at 16:03

## 2022-04-20 RX ADMIN — Medication 650 MILLIGRAM(S): at 23:38

## 2022-04-20 RX ADMIN — Medication 650 MILLIGRAM(S): at 12:45

## 2022-04-20 RX ADMIN — HEPARIN SODIUM 5000 UNIT(S): 5000 INJECTION INTRAVENOUS; SUBCUTANEOUS at 05:55

## 2022-04-20 RX ADMIN — SODIUM CHLORIDE 3 MILLILITER(S): 9 INJECTION INTRAMUSCULAR; INTRAVENOUS; SUBCUTANEOUS at 14:18

## 2022-04-20 RX ADMIN — OXYCODONE HYDROCHLORIDE 10 MILLIGRAM(S): 5 TABLET ORAL at 18:15

## 2022-04-20 RX ADMIN — Medication 650 MILLIGRAM(S): at 02:42

## 2022-04-20 RX ADMIN — OXYCODONE HYDROCHLORIDE 10 MILLIGRAM(S): 5 TABLET ORAL at 18:47

## 2022-04-20 RX ADMIN — Medication 10 MILLIEQUIVALENT(S): at 08:35

## 2022-04-20 RX ADMIN — Medication 650 MILLIGRAM(S): at 12:15

## 2022-04-20 RX ADMIN — SODIUM CHLORIDE 3 MILLILITER(S): 9 INJECTION INTRAMUSCULAR; INTRAVENOUS; SUBCUTANEOUS at 05:49

## 2022-04-20 RX ADMIN — Medication 12.5 MILLIGRAM(S): at 12:14

## 2022-04-20 RX ADMIN — Medication 12.5 MILLIGRAM(S): at 00:16

## 2022-04-20 RX ADMIN — HEPARIN SODIUM 5000 UNIT(S): 5000 INJECTION INTRAVENOUS; SUBCUTANEOUS at 18:16

## 2022-04-20 RX ADMIN — SIMVASTATIN 10 MILLIGRAM(S): 20 TABLET, FILM COATED ORAL at 22:15

## 2022-04-20 RX ADMIN — Medication 12.5 MILLIGRAM(S): at 05:57

## 2022-04-20 RX ADMIN — SODIUM CHLORIDE 3 MILLILITER(S): 9 INJECTION INTRAMUSCULAR; INTRAVENOUS; SUBCUTANEOUS at 22:18

## 2022-04-20 NOTE — PROGRESS NOTE ADULT - SUBJECTIVE AND OBJECTIVE BOX
Patient discussed on morning rounds with Dr. Faulkner      Operation / Date: 4/18/22 Minimally invasive MV repair    SUBJECTIVE ASSESSMENT:  61y Female seen and examined at bedside.  Patient with no complaints.  Denies chest pain, shortness of breath, nausea, vomiting.  Reports no BM since surgery.  Able to pull 500cc on IS.  Ambulated 5x yesterday.        Vital Signs Last 24 Hrs  T(C): 36.2 (20 Apr 2022 05:01), Max: 37 (19 Apr 2022 17:31)  T(F): 97.2 (20 Apr 2022 05:01), Max: 98.6 (19 Apr 2022 17:31)  HR: 73 (20 Apr 2022 05:13) (68 - 86)  BP: 111/57 (20 Apr 2022 05:13) (111/57 - 135/61)  BP(mean): 79 (20 Apr 2022 05:13) (79 - 91)  RR: 20 (20 Apr 2022 05:13) (15 - 22)  SpO2: 99% (20 Apr 2022 05:13) (98% - 100%)  I&O's Detail    19 Apr 2022 07:01  -  20 Apr 2022 07:00  --------------------------------------------------------  IN:    Albumin 5%  - 250 mL: 1000 mL    IV PiggyBack: 150 mL    Oral Fluid: 310 mL    sodium chloride 0.9%: 50 mL  Total IN: 1510 mL    OUT:    Bulb (mL): 0 mL    Bulb (mL): 200 mL    Chest Tube (mL): 5 mL    Indwelling Catheter - Urethral (mL): 180 mL    Voided (mL): 750 mL  Total OUT: 1135 mL    Total NET: 375 mL          CHEST TUBE:  No.  GENARO DRAIN:  Yes.  EPICARDIAL WIRES: Yes.  TIE DOWNS: Yes.  SOUSA: Yes.    PHYSICAL EXAM:    GEN: NAD, looks comfortable  Psych: Mood appropriate  Neuro: A&Ox3.  No focal deficits.  Moving all extremities.   HEENT: No obvious abnormalities  CV: S1S2, regular, no murmurs appreciated.  No carotid bruits.  No JVD  Lungs: Clear B/L.  No wheezing, rales or rhonchi  ABD: Soft, non-tender, non-distended.  +Bowel sounds  EXT: Warm and well perfused.  No peripheral edema noted  Musculoskeletal: Moving all extremities with normal ROM, no joint swelling  PV: Pedal pulses palpable  Incision: right thoractomy incision CDI, no drainage    LABS:                        10.2   11.55 )-----------( 159      ( 20 Apr 2022 05:56 )             31.0       COUMADIN:  No. REASON: .    PT/INR - ( 19 Apr 2022 10:44 )   PT: 14.2 sec;   INR: 1.19          PTT - ( 19 Apr 2022 10:44 )  PTT:30.3 sec    04-20    138  |  102  |  9   ----------------------------<  98  4.0   |  26  |  0.64    Ca    9.4      20 Apr 2022 05:56  Phos  2.8     04-20  Mg     2.0     04-20    TPro  6.4  /  Alb  4.4  /  TBili  0.5  /  DBili  x   /  AST  32  /  ALT  19  /  AlkPhos  57  04-19          MEDICATIONS  (STANDING):  albumin human  5% IVPB 250 milliLiter(s) IV Intermittent every 30 minutes  aspirin enteric coated 81 milliGRAM(s) Oral daily  folic acid 1 milliGRAM(s) Oral daily  glucagon  Injectable 1 milliGRAM(s) IntraMuscular once  heparin   Injectable 5000 Unit(s) SubCutaneous every 12 hours  insulin lispro (ADMELOG) corrective regimen sliding scale   SubCutaneous Before meals and at bedtime  levothyroxine 88 MICROGram(s) Oral daily  lidocaine   4% Patch 1 Patch Transdermal daily  methylPREDNISolone   Oral   methylPREDNISolone 24 milliGRAM(s) Oral once  metoclopramide Injectable 10 milliGRAM(s) IV Push once  metoprolol tartrate 12.5 milliGRAM(s) Oral every 6 hours  pantoprazole    Tablet 40 milliGRAM(s) Oral before breakfast  polyethylene glycol 3350 17 Gram(s) Oral daily  simvastatin 10 milliGRAM(s) Oral at bedtime  sodium chloride 0.9% lock flush 3 milliLiter(s) IV Push every 8 hours    MEDICATIONS  (PRN):  acetaminophen     Tablet .. 650 milliGRAM(s) Oral every 6 hours PRN Temp greater or equal to 38C (100.4F), Mild Pain (1 - 3)  oxyCODONE    IR 10 milliGRAM(s) Oral every 6 hours PRN Severe Pain (7 - 10)  oxyCODONE    IR 5 milliGRAM(s) Oral every 6 hours PRN Moderate Pain (4 - 6)        RADIOLOGY & ADDITIONAL TESTS:

## 2022-04-21 ENCOUNTER — TRANSCRIPTION ENCOUNTER (OUTPATIENT)
Age: 62
End: 2022-04-21

## 2022-04-21 VITALS — TEMPERATURE: 98 F

## 2022-04-21 LAB
ANION GAP SERPL CALC-SCNC: 16 MMOL/L — SIGNIFICANT CHANGE UP (ref 5–17)
BUN SERPL-MCNC: 15 MG/DL — SIGNIFICANT CHANGE UP (ref 7–23)
CALCIUM SERPL-MCNC: 9.8 MG/DL — SIGNIFICANT CHANGE UP (ref 8.4–10.5)
CHLORIDE SERPL-SCNC: 100 MMOL/L — SIGNIFICANT CHANGE UP (ref 96–108)
CO2 SERPL-SCNC: 23 MMOL/L — SIGNIFICANT CHANGE UP (ref 22–31)
CREAT SERPL-MCNC: 0.52 MG/DL — SIGNIFICANT CHANGE UP (ref 0.5–1.3)
EGFR: 106 ML/MIN/1.73M2 — SIGNIFICANT CHANGE UP
GLUCOSE BLDC GLUCOMTR-MCNC: 120 MG/DL — HIGH (ref 70–99)
GLUCOSE SERPL-MCNC: 115 MG/DL — HIGH (ref 70–99)
HCT VFR BLD CALC: 34.6 % — SIGNIFICANT CHANGE UP (ref 34.5–45)
HGB BLD-MCNC: 11.5 G/DL — SIGNIFICANT CHANGE UP (ref 11.5–15.5)
MAGNESIUM SERPL-MCNC: 2 MG/DL — SIGNIFICANT CHANGE UP (ref 1.6–2.6)
MCHC RBC-ENTMCNC: 27.3 PG — SIGNIFICANT CHANGE UP (ref 27–34)
MCHC RBC-ENTMCNC: 33.2 GM/DL — SIGNIFICANT CHANGE UP (ref 32–36)
MCV RBC AUTO: 82.2 FL — SIGNIFICANT CHANGE UP (ref 80–100)
NRBC # BLD: 0 /100 WBCS — SIGNIFICANT CHANGE UP (ref 0–0)
PLATELET # BLD AUTO: 186 K/UL — SIGNIFICANT CHANGE UP (ref 150–400)
POTASSIUM SERPL-MCNC: 3.5 MMOL/L — SIGNIFICANT CHANGE UP (ref 3.5–5.3)
POTASSIUM SERPL-SCNC: 3.5 MMOL/L — SIGNIFICANT CHANGE UP (ref 3.5–5.3)
RBC # BLD: 4.21 M/UL — SIGNIFICANT CHANGE UP (ref 3.8–5.2)
RBC # FLD: 14.6 % — HIGH (ref 10.3–14.5)
SODIUM SERPL-SCNC: 139 MMOL/L — SIGNIFICANT CHANGE UP (ref 135–145)
WBC # BLD: 13.67 K/UL — HIGH (ref 3.8–10.5)
WBC # FLD AUTO: 13.67 K/UL — HIGH (ref 3.8–10.5)

## 2022-04-21 PROCEDURE — C1889: CPT

## 2022-04-21 PROCEDURE — 83735 ASSAY OF MAGNESIUM: CPT

## 2022-04-21 PROCEDURE — 80053 COMPREHEN METABOLIC PANEL: CPT

## 2022-04-21 PROCEDURE — 82803 BLOOD GASES ANY COMBINATION: CPT

## 2022-04-21 PROCEDURE — 71046 X-RAY EXAM CHEST 2 VIEWS: CPT

## 2022-04-21 PROCEDURE — 80048 BASIC METABOLIC PNL TOTAL CA: CPT

## 2022-04-21 PROCEDURE — 36415 COLL VENOUS BLD VENIPUNCTURE: CPT

## 2022-04-21 PROCEDURE — 84132 ASSAY OF SERUM POTASSIUM: CPT

## 2022-04-21 PROCEDURE — 85610 PROTHROMBIN TIME: CPT

## 2022-04-21 PROCEDURE — P9045: CPT

## 2022-04-21 PROCEDURE — 85014 HEMATOCRIT: CPT

## 2022-04-21 PROCEDURE — 94002 VENT MGMT INPAT INIT DAY: CPT

## 2022-04-21 PROCEDURE — 84295 ASSAY OF SERUM SODIUM: CPT

## 2022-04-21 PROCEDURE — 86900 BLOOD TYPING SEROLOGIC ABO: CPT

## 2022-04-21 PROCEDURE — 71045 X-RAY EXAM CHEST 1 VIEW: CPT | Mod: 26

## 2022-04-21 PROCEDURE — 85730 THROMBOPLASTIN TIME PARTIAL: CPT

## 2022-04-21 PROCEDURE — 85027 COMPLETE CBC AUTOMATED: CPT

## 2022-04-21 PROCEDURE — 71045 X-RAY EXAM CHEST 1 VIEW: CPT

## 2022-04-21 PROCEDURE — 97161 PT EVAL LOW COMPLEX 20 MIN: CPT

## 2022-04-21 PROCEDURE — 86891 AUTOLOGOUS BLOOD OP SALVAGE: CPT

## 2022-04-21 PROCEDURE — 86901 BLOOD TYPING SEROLOGIC RH(D): CPT

## 2022-04-21 PROCEDURE — 86850 RBC ANTIBODY SCREEN: CPT

## 2022-04-21 PROCEDURE — 84100 ASSAY OF PHOSPHORUS: CPT

## 2022-04-21 PROCEDURE — 82962 GLUCOSE BLOOD TEST: CPT

## 2022-04-21 PROCEDURE — 86923 COMPATIBILITY TEST ELECTRIC: CPT

## 2022-04-21 PROCEDURE — 97116 GAIT TRAINING THERAPY: CPT

## 2022-04-21 PROCEDURE — 93005 ELECTROCARDIOGRAM TRACING: CPT

## 2022-04-21 PROCEDURE — 85025 COMPLETE CBC W/AUTO DIFF WBC: CPT

## 2022-04-21 PROCEDURE — 82947 ASSAY GLUCOSE BLOOD QUANT: CPT

## 2022-04-21 PROCEDURE — 82330 ASSAY OF CALCIUM: CPT

## 2022-04-21 RX ORDER — PANTOPRAZOLE 40 MG/1
40 TABLET, DELAYED RELEASE ORAL DAILY
Refills: 0 | Status: ACTIVE | COMMUNITY

## 2022-04-21 RX ORDER — FUROSEMIDE 40 MG
1 TABLET ORAL
Qty: 14 | Refills: 0
Start: 2022-04-21 | End: 2022-05-04

## 2022-04-21 RX ORDER — POTASSIUM CHLORIDE 20 MEQ
40 PACKET (EA) ORAL ONCE
Refills: 0 | Status: COMPLETED | OUTPATIENT
Start: 2022-04-21 | End: 2022-04-21

## 2022-04-21 RX ORDER — ASPIRIN/CALCIUM CARB/MAGNESIUM 324 MG
1 TABLET ORAL
Qty: 30 | Refills: 0
Start: 2022-04-21 | End: 2022-05-20

## 2022-04-21 RX ORDER — OXYCODONE HYDROCHLORIDE 5 MG/1
1 TABLET ORAL
Qty: 28 | Refills: 0
Start: 2022-04-21 | End: 2022-04-27

## 2022-04-21 RX ORDER — OLMESARTAN MEDOXOMIL 5 MG/1
1 TABLET, FILM COATED ORAL
Qty: 0 | Refills: 0 | DISCHARGE

## 2022-04-21 RX ORDER — POTASSIUM CHLORIDE 750 MG/1
10 TABLET, FILM COATED, EXTENDED RELEASE ORAL DAILY
Qty: 30 | Refills: 0 | Status: ACTIVE | COMMUNITY

## 2022-04-21 RX ORDER — AMLODIPINE BESYLATE 2.5 MG/1
1 TABLET ORAL
Qty: 0 | Refills: 0 | DISCHARGE

## 2022-04-21 RX ORDER — PNV NO.95/FERROUS FUM/FOLIC AC 28MG-0.8MG
TABLET ORAL DAILY
Refills: 0 | Status: ACTIVE | COMMUNITY

## 2022-04-21 RX ORDER — OXYCODONE 5 MG/1
5 TABLET ORAL
Refills: 0 | Status: ACTIVE | COMMUNITY

## 2022-04-21 RX ORDER — SIMVASTATIN 20 MG/1
1 TABLET, FILM COATED ORAL
Qty: 30 | Refills: 0
Start: 2022-04-21 | End: 2022-05-20

## 2022-04-21 RX ORDER — CHOLECALCIFEROL (VITAMIN D3) 25 MCG
TABLET ORAL DAILY
Refills: 0 | Status: ACTIVE | COMMUNITY

## 2022-04-21 RX ORDER — METHYLPREDNISOLONE 4 MG/1
TABLET ORAL
Refills: 0 | Status: ACTIVE | COMMUNITY

## 2022-04-21 RX ORDER — IBUPROFEN 200 MG
1 TABLET ORAL
Qty: 56 | Refills: 0
Start: 2022-04-21 | End: 2022-05-04

## 2022-04-21 RX ORDER — ASPIRIN 81 MG
81 TABLET, DELAYED RELEASE (ENTERIC COATED) ORAL DAILY
Qty: 30 | Refills: 5 | Status: ACTIVE | COMMUNITY

## 2022-04-21 RX ORDER — POTASSIUM CHLORIDE 20 MEQ
1 PACKET (EA) ORAL
Qty: 14 | Refills: 0
Start: 2022-04-21 | End: 2022-05-04

## 2022-04-21 RX ORDER — AMLODIPINE BESYLATE 2.5 MG/1
1 TABLET ORAL
Qty: 30 | Refills: 0
Start: 2022-04-21 | End: 2022-05-20

## 2022-04-21 RX ORDER — RIBOFLAVIN (VITAMIN B2) 50 MG
TABLET ORAL
Refills: 0 | Status: COMPLETED | COMMUNITY
End: 2022-04-21

## 2022-04-21 RX ORDER — SIMVASTATIN 20 MG/1
1 TABLET, FILM COATED ORAL
Qty: 0 | Refills: 0 | DISCHARGE

## 2022-04-21 RX ORDER — ACETAMINOPHEN 500 MG
1 TABLET ORAL
Qty: 120 | Refills: 0
Start: 2022-04-21 | End: 2022-05-20

## 2022-04-21 RX ORDER — IBUPROFEN 200 MG
600 TABLET ORAL ONCE
Refills: 0 | Status: COMPLETED | OUTPATIENT
Start: 2022-04-21 | End: 2022-04-21

## 2022-04-21 RX ORDER — IBUPROFEN 600 MG/1
600 TABLET, FILM COATED ORAL EVERY 6 HOURS
Refills: 0 | Status: ACTIVE | COMMUNITY

## 2022-04-21 RX ORDER — FOLIC ACID 1 MG/1
1 TABLET ORAL
Qty: 30 | Refills: 3 | Status: ACTIVE | COMMUNITY

## 2022-04-21 RX ORDER — METOPROLOL TARTRATE 50 MG/1
50 TABLET, FILM COATED ORAL
Qty: 60 | Refills: 3 | Status: ACTIVE | COMMUNITY

## 2022-04-21 RX ORDER — METOPROLOL TARTRATE 50 MG
1 TABLET ORAL
Qty: 60 | Refills: 0
Start: 2022-04-21 | End: 2022-05-20

## 2022-04-21 RX ORDER — SENNA PLUS 8.6 MG/1
2 TABLET ORAL
Qty: 60 | Refills: 0
Start: 2022-04-21 | End: 2022-05-20

## 2022-04-21 RX ORDER — FUROSEMIDE 20 MG/1
20 TABLET ORAL DAILY
Qty: 30 | Refills: 0 | Status: ACTIVE | COMMUNITY

## 2022-04-21 RX ORDER — PANTOPRAZOLE SODIUM 20 MG/1
1 TABLET, DELAYED RELEASE ORAL
Qty: 30 | Refills: 0
Start: 2022-04-21 | End: 2022-05-20

## 2022-04-21 RX ADMIN — HEPARIN SODIUM 5000 UNIT(S): 5000 INJECTION INTRAVENOUS; SUBCUTANEOUS at 05:42

## 2022-04-21 RX ADMIN — OXYCODONE HYDROCHLORIDE 10 MILLIGRAM(S): 5 TABLET ORAL at 01:15

## 2022-04-21 RX ADMIN — PANTOPRAZOLE SODIUM 40 MILLIGRAM(S): 20 TABLET, DELAYED RELEASE ORAL at 07:02

## 2022-04-21 RX ADMIN — OXYCODONE HYDROCHLORIDE 10 MILLIGRAM(S): 5 TABLET ORAL at 06:17

## 2022-04-21 RX ADMIN — OXYCODONE HYDROCHLORIDE 10 MILLIGRAM(S): 5 TABLET ORAL at 00:00

## 2022-04-21 RX ADMIN — Medication 600 MILLIGRAM(S): at 08:30

## 2022-04-21 RX ADMIN — Medication 650 MILLIGRAM(S): at 05:42

## 2022-04-21 RX ADMIN — Medication 4 MILLIGRAM(S): at 06:17

## 2022-04-21 RX ADMIN — Medication 81 MILLIGRAM(S): at 11:09

## 2022-04-21 RX ADMIN — POLYETHYLENE GLYCOL 3350 17 GRAM(S): 17 POWDER, FOR SOLUTION ORAL at 11:09

## 2022-04-21 RX ADMIN — Medication 25 MILLIGRAM(S): at 07:02

## 2022-04-21 RX ADMIN — LIDOCAINE 1 PATCH: 4 CREAM TOPICAL at 00:00

## 2022-04-21 RX ADMIN — OXYCODONE HYDROCHLORIDE 10 MILLIGRAM(S): 5 TABLET ORAL at 07:15

## 2022-04-21 RX ADMIN — Medication 650 MILLIGRAM(S): at 06:48

## 2022-04-21 RX ADMIN — Medication 40 MILLIEQUIVALENT(S): at 10:52

## 2022-04-21 RX ADMIN — Medication 88 MICROGRAM(S): at 05:19

## 2022-04-21 RX ADMIN — Medication 5 MILLIGRAM(S): at 05:43

## 2022-04-21 RX ADMIN — Medication 10 MILLIGRAM(S): at 04:21

## 2022-04-21 RX ADMIN — Medication 650 MILLIGRAM(S): at 00:15

## 2022-04-21 RX ADMIN — Medication 25 MILLIGRAM(S): at 01:08

## 2022-04-21 RX ADMIN — Medication 1 MILLIGRAM(S): at 11:09

## 2022-04-21 RX ADMIN — SODIUM CHLORIDE 3 MILLILITER(S): 9 INJECTION INTRAMUSCULAR; INTRAVENOUS; SUBCUTANEOUS at 05:09

## 2022-04-21 NOTE — DISCHARGE NOTE NURSING/CASE MANAGEMENT/SOCIAL WORK - NSDCPEFALRISK_GEN_ALL_CORE
For information on Fall & Injury Prevention, visit: [email or web address] OR  [email or web address] OR  https://www.cdc.gov/steadi/patient.html

## 2022-04-21 NOTE — DISCHARGE NOTE PROVIDER - NSDCMRMEDTOKEN_GEN_ALL_CORE_FT
Benicar 40 mg oral tablet: 1 tab(s) orally once a day  folic acid 1 mg oral tablet: 1 tab(s) orally once a day  Norvasc 5 mg oral tablet: 1 tab(s) orally once a day  simvastatin 10 mg oral tablet: 1 tab(s) orally once a day (at bedtime)  Synthroid 88 mcg (0.088 mg) oral tablet: 1 tab(s) orally once a day  Vitamin B12: 1 tab(s) orally once a day  Vitamin D3: 1 tab(s) orally once a day   acetaminophen 325 mg oral tablet: 1 tab(s) orally every 6 hours, As Needed -Temp greater or equal to 38C (100.4F), Mild Pain (1 - 3) MDD:8 tabs  aspirin 81 mg oral delayed release tablet: 1 tab(s) orally once a day  folic acid 1 mg oral tablet: 1 tab(s) orally once a day  ibuprofen 600 mg oral tablet: 1 tab(s) orally every 6 hours MDD:4 tabs  Lasix 20 mg oral tablet: 1 tab(s) orally once a day   Medrol Dosepak 4 mg oral tablet: 1 application orally 4 times a day   metoprolol tartrate 50 mg oral tablet: 1 tab(s) orally every 12 hours   Norvasc 5 mg oral tablet: 1 tab(s) orally once a day  oxyCODONE 5 mg oral tablet: 1 tab(s) orally every 6 hours, As needed, Moderate Pain (4 - 6) MDD:4 tabs  pantoprazole 40 mg oral delayed release tablet: 1 tab(s) orally once a day (before a meal)  potassium chloride 10 mEq oral tablet, extended release: 1 tab(s) orally once a day   senna oral tablet: 2 tab(s) orally once a day (at bedtime)  simvastatin 10 mg oral tablet: 1 tab(s) orally once a day (at bedtime)  Synthroid 88 mcg (0.088 mg) oral tablet: 1 tab(s) orally once a day  Vitamin B12: 1 tab(s) orally once a day  Vitamin D3: 1 tab(s) orally once a day

## 2022-04-21 NOTE — PROGRESS NOTE ADULT - SUBJECTIVE AND OBJECTIVE BOX
Patient discussed on morning rounds with Dr. Weller     Operation / Date: Mini MV repair on 4/18/22    Surgeon: Dr. Faulkner     Referring Physician: Dr. Nguyen     SUBJECTIVE ASSESSMENT:  61y Female seen at bedside.  Doing well, continues to report incisional pain that is improved with medications.  She is ambulating and tolerating room air and feels ready to go home today.  Denies HA, AMS, CP, palpitations, SOB, cough, hemoptysis, n/v/d, fever.     Hospital Course:  This is a 61 year old female with history of HTN, HLD, Hypothyroidism, aortic and mitral insufficiency followed by Dr. Nguyen for known valvulopathy.  In 10/2021 she had a repeat eCHO showing worsening MV regurgitation with progressively worsening ALICEA and associated exertional chest pain and diminished exercise tolerance.  She was referred to Dr. Faulkner for surgical evaluation, deemed a surgical candidate after completion of workup and was admitted to [facility] on 4/18/22 for planned minimally invasive MV repair.  She arrived to CTICU in stable condition, extubated on POD 0.  POD 1, BB started, transferred to floor care.  POD 2, Medrol started, chest tube removed.  Otherwise stable.  On 4/21/22, POD 3, she was evaluated in AM and medically cleared by Dr. Weller for discharge to home with plan for outpatient follow-up.  Over 35 minutes was spent with the patient reviewing the discharge material including medications, follow up appointments, recovery, concerning symptoms, and how to contact their health care providers if they have questions.      Vital Signs Last 24 Hrs  T(C): 37.1 (21 Apr 2022 01:01), Max: 37.1 (20 Apr 2022 22:35)  T(F): 98.7 (21 Apr 2022 01:01), Max: 98.7 (20 Apr 2022 22:35)  HR: 82 (21 Apr 2022 07:02) (77 - 87)  BP: 126/97 (21 Apr 2022 07:02) (126/97 - 151/80)  BP(mean): 107 (21 Apr 2022 07:02) (92 - 110)  RR: 18 (21 Apr 2022 07:02) (18 - 20)  SpO2: 100% (21 Apr 2022 07:02) (99% - 100%)  I&O's Detail    20 Apr 2022 07:01  -  21 Apr 2022 07:00  --------------------------------------------------------  IN:    Oral Fluid: 1140 mL  Total IN: 1140 mL    OUT:    Bulb (mL): 80 mL    Voided (mL): 1900 mL  Total OUT: 1980 mL    Total NET: -840 mL      EPICARDIAL WIRES REMOVED: Yes.  TIE DOWNS REMOVED: No.    PHYSICAL EXAM:  General: OOB to chair, no acute distress.   Neurological: AAOx3, no AMS or focal deficits.   Cardiovascular: RRR, S1/S2, no m/r/g.   Respiratory: No acute distress, CTA b/l, no w/r/r.   Gastrointestinal: ND, NBS, non-TTP.  Extremities: Warm and well perfused, no calf ttp b/l.  No edema b/l.   Vascular: Pulses 2+ throughout.   Incision sites: Right thoracotomy: C/D/I    LABS:                        11.5   13.67 )-----------( 186      ( 21 Apr 2022 05:41 )             34.6       COUMADIN:  No.   PT/INR - ( 19 Apr 2022 10:44 )   PT: 14.2 sec;   INR: 1.19          PTT - ( 19 Apr 2022 10:44 )  PTT:30.3 sec    04-21    139  |  100  |  15  ----------------------------<  115<H>  3.5   |  23  |  0.52    Ca    9.8      21 Apr 2022 05:41  Phos  2.8     04-20  Mg     2.0     04-21    TPro  6.4  /  Alb  4.4  /  TBili  0.5  /  DBili  x   /  AST  32  /  ALT  19  /  AlkPhos  57  04-19    Discharge Medications	acetaminophen 325 mg oral tablet: 1 tab(s) orally every 6 hours, As Needed -Temp greater or equal to 38C (100.4F), Mild Pain (1 - 3) MDD:8 tabs  aspirin 81 mg oral delayed release tablet: 1 tab(s) orally once a day  folic acid 1 mg oral tablet: 1 tab(s) orally once a day  ibuprofen 600 mg oral tablet: 1 tab(s) orally every 6 hours MDD:4 tabs  Lasix 20 mg oral tablet: 1 tab(s) orally once a day   Medrol Dosepak 4 mg oral tablet: 1 application orally 4 times a day   metoprolol tartrate 50 mg oral tablet: 1 tab(s) orally every 12 hours   Norvasc 5 mg oral tablet: 1 tab(s) orally once a day  oxyCODONE 5 mg oral tablet: 1 tab(s) orally every 6 hours, As needed, Moderate Pain (4 - 6) MDD:4 tabs  pantoprazole 40 mg oral delayed release tablet: 1 tab(s) orally once a day (before a meal)  potassium chloride 10 mEq oral tablet, extended release: 1 tab(s) orally once a day   senna oral tablet: 2 tab(s) orally once a day (at bedtime)  simvastatin 10 mg oral tablet: 1 tab(s) orally once a day (at bedtime)  Synthroid 88 mcg (0.088 mg) oral tablet: 1 tab(s) orally once a day  Vitamin B12: 1 tab(s) orally once a day  Vitamin D3: 1 tab(s) orally once a day  ,      Discharge CXR:  < from: Xray Chest 2 Views PA/Lat (04.20.22 @ 10:19) >  ACC: [id number] EXAM:  XR CHEST PA LAT 2V                          PROCEDURE DATE:  04/20/2022          INTERPRETATION:  Clinical history/reason for exam: Postop.    PA and lateral.    COMPARISON: April 20, 2022 time 4:13 AM.    Findings/  impression: Support device in satisfactory position. Stable cardiomegaly,   mitral valve replacement. Right basilar opacity/pleural effusion and left   basilar focal atelectasis, unchanged. Stable bony structures.    --- End of Report ---    < end of copied text >

## 2022-04-21 NOTE — DISCHARGE NOTE PROVIDER - NSDCCPCAREPLAN_GEN_ALL_CORE_FT
PRINCIPAL DISCHARGE DIAGNOSIS  Diagnosis: Mitral regurgitation  Assessment and Plan of Treatment: It is very important that you continue your medications as prescribed after you are discharged.  You should refer to the medication reconciliation form provided to you on your discharge to information as to what medications to take and when.    Additionally, your medications have been sent to [facility] at [facility] and can be picked up as you are leaving the hospital.  The pharmacy is located next to the admitting office on the first floor.  If you have any issues obtaining your medications on the day of your discharge, please call [phone] for further assistance.      SECONDARY DISCHARGE DIAGNOSES  Diagnosis: Post-operative state  Assessment and Plan of Treatment: -Walk daily as tolerated and use your incentive spirometer every hour.  -No driving or strenuous activity/exercise for 6 weeks, or until  cleared by your surgeon.  -Gently clean your incisions with anti-bacterial soap and water, pat  dry.  You may leave them open to air.  -Call your doctor if you have shortness of breath, chest pain not  relieved by pain medication, dizziness, fever >101.5, or increased  redness or drainage from     Diagnosis: Post-op pain  Assessment and Plan of Treatment: You are expected to have pain postoperatively.  However, too much pain can limit your ability to breathe deeply and increase your risk of developing fluid collection around your lungs or a pneumonia.  In order to help you control your pain, we have prescribed the following medications.   1. Tylenol 325mg by mouth- Take 1-2 tablets by mouth every 6 hours as needed for moderate pain, max dose 8 tablets in 24 hours  2. Ibuprofen 600mg by mouth- Take 1 tablet by mouth every 6 hours as needed for moderate pain, max dose 4 tablets in 24 hours  3. Oxycodone 5mg- Take 1 tablet by mouth every 6 hours as needed for severe pain, max dose 4 tablets in 24 hours.   We recommend you alternate tylenol and ibuprofen in the following manner to maximize your pain control:  6am: Tylenol as needed  9am: Motrin as needed   12noon: Tylenol as needed  2pm: Motrin as needed  4pm: Tylenol as needed  6pm: Motrin as needed  You may use the Oxycodone if you are experiencing pain that cannot be controlled by using tylenol and motrin.     PRINCIPAL DISCHARGE DIAGNOSIS  Diagnosis: Mitral regurgitation  Assessment and Plan of Treatment: It is very important that you continue your medications as prescribed after you are discharged.  You should refer to the medication reconciliation form provided to you on your discharge to information as to what medications to take and when.    Additionally, your medications have been sent to [facility] at [facility] and can be picked up as you are leaving the hospital.  The pharmacy is located next to the admitting office on the first floor.  If you have any issues obtaining your medications on the day of your discharge, please call [phone] for further assistance.      SECONDARY DISCHARGE DIAGNOSES  Diagnosis: Post-operative state  Assessment and Plan of Treatment: -Walk daily as tolerated and use your incentive spirometer every hour.  -No driving or strenuous activity/exercise for 6 weeks, or until  cleared by your surgeon.  -Gently clean your incisions with anti-bacterial soap and water, pat  dry.  You may leave them open to air.  -Call your doctor if you have shortness of breath, chest pain not  relieved by pain medication, dizziness, fever >101.5, or increased  redness or drainage from     Diagnosis: Post-op pain  Assessment and Plan of Treatment: You are expected to have pain postoperatively.  However, too much pain can limit your ability to breathe deeply and increase your risk of developing fluid collection around your lungs or a pneumonia.  In order to help you control your pain, we have prescribed the following medications.   1. Tylenol 325mg by mouth- Take 1-2 tablets by mouth every 6 hours as needed for moderate pain, max dose 8 tablets in 24 hours  2. Ibuprofen 600mg by mouth- Take 1 tablet by mouth every 6 hours as needed for moderate pain, max dose 4 tablets in 24 hours  3. Percocet 325mg/5mg- Take 1 tablet by mouth every 6 hours as needed for severe pain, max dose 4 tablets in 24 hours.   We recommend you alternate tylenol and ibuprofen in the following manner to maximize your pain control:  6am: Tylenol as needed  9am: Motrin as needed   12noon: Tylenol as needed  2pm: Motrin as needed  4pm: Tylenol as needed  6pm: Motrin as needed  You may use the percocet if you are experiencing pain that cannot be controlled by using tylenol and motrin.  Please be aware that Percocet also has tylenol in it (325mg in each pill), and you can only take up to 4000mg (4grams) in a 24 hour period.

## 2022-04-21 NOTE — DISCHARGE NOTE PROVIDER - CARE PROVIDER_API CALL
Bakari Faulkner (MD)  Cardiovascular Surgery  [address] [place] [address]  Phone: [phone]  Fax: [phone]  Follow Up Time:     MATILDE MANDUJANO  Cardiology  [address] [place] [address]  Phone: ([phone]  Fax: [phone]  Follow Up Time:    Bakari Faulkner (MD)  Cardiovascular Surgery  [address] [place] [address]  Phone: [phone]  Fax: [phone]  Scheduled Appointment: 05/03/2022 11:45 AM    MATILDE MANDUJANO  Cardiology  [address] [place] [address]  Phone: ([phone]  Fax: [phone]  Scheduled Appointment: 04/28/2022 11:45 AM

## 2022-04-21 NOTE — DISCHARGE NOTE NURSING/CASE MANAGEMENT/SOCIAL WORK - PATIENT PORTAL LINK FT
You can access the FollowMyHealth Patient Portal offered by [facility] by registering at the following website: [email or web address]. By joining [facility]’s FollowMyHealth portal, you will also be able to view your health information using other applications (apps) compatible with our system.

## 2022-04-21 NOTE — DISCHARGE NOTE PROVIDER - CARE PROVIDERS DIRECT ADDRESSES
[email or web address]

## 2022-04-21 NOTE — PROGRESS NOTE ADULT - REASON FOR ADMISSION
Mitral Valve Regurgitation

## 2022-04-21 NOTE — DISCHARGE NOTE PROVIDER - PROVIDER TOKENS
PROVIDER:[TOKEN:[[id number]]],PROVIDER:[TOKEN:[[id number]:[id number]]] PROVIDER:[TOKEN:[[id number]],SCHEDULEDAPPT:[05/03/2022],SCHEDULEDAPPTTIME:[11:45 AM]],PROVIDER:[TOKEN:[[id number]],SCHEDULEDAPPT:[04/28/2022],SCHEDULEDAPPTTIME:[11:45 AM]]

## 2022-04-21 NOTE — DISCHARGE NOTE PROVIDER - HOSPITAL COURSE
This is a 61 year old female with history of HTN, HLD, Hypothyroidism, aortic and mitral insufficiency followed by Dr. Nguyen for known valvulopathy.  In 10/2021 she had a repeat eCHO showing worsening MV regurgitation with progressively worsening ALICEA and associated exertional chest pain and diminished exercise tolerance.  She was referred to Dr. Faulkner for surgical evaluation, deemed a surgical candidate after completion of workup and was admitted to [facility] on 4/18/22 for planned minimally invasive MV repair.  She arrived to CTICU in stable condition, extubated on POD 0.  POD 1, BB started, transferred to floor care.  POD 2, Medrol started, chest tube removed.  Otherwise stable.  On 4/21/22, POD 3, she was evaluated in AM and medically cleared by Dr. Weller for discharge to home with plan for outpatient follow-up.  Over 35 minutes was spent with the patient reviewing the discharge material including medications, follow up appointments, recovery, concerning symptoms, and how to contact their health care providers if they have questions.

## 2022-04-21 NOTE — PROGRESS NOTE ADULT - ASSESSMENT
Discharge Diagnoses, Assessment and Plan of Treatment	PRINCIPAL DISCHARGE DIAGNOSIS  Diagnosis: Mitral regurgitation  Assessment and Plan of Treatment: It is very important that you continue your medications as prescribed after you are discharged.  You should refer to the medication reconciliation form provided to you on your discharge to information as to what medications to take and when.    Additionally, your medications have been sent to [facility] at [facility] and can be picked up as you are leaving the hospital.  The pharmacy is located next to the admitting office on the first floor.  If you have any issues obtaining your medications on the day of your discharge, please call [phone] for further assistance.      SECONDARY DISCHARGE DIAGNOSES  Diagnosis: Post-operative state  Assessment and Plan of Treatment: -Walk daily as tolerated and use your incentive spirometer every hour.  -No driving or strenuous activity/exercise for 6 weeks, or until  cleared by your surgeon.  -Gently clean your incisions with anti-bacterial soap and water, pat  dry.  You may leave them open to air.  -Call your doctor if you have shortness of breath, chest pain not  relieved by pain medication, dizziness, fever >101.5, or increased  redness or drainage from     Diagnosis: Post-op pain  Assessment and Plan of Treatment: You are expected to have pain postoperatively.  However, too much pain can limit your ability to breathe deeply and increase your risk of developing fluid collection around your lungs or a pneumonia.  In order to help you control your pain, we have prescribed the following medications.   1. Tylenol 325mg by mouth- Take 1-2 tablets by mouth every 6 hours as needed for moderate pain, max dose 8 tablets in 24 hours  2. Ibuprofen 600mg by mouth- Take 1 tablet by mouth every 6 hours as needed for moderate pain, max dose 4 tablets in 24 hours  3. Oxycodone 5mg- Take 1 tablet by mouth every 6 hours as needed for severe pain, max dose 4 tablets in 24 hours.   We recommend you alternate tylenol and ibuprofen in the following manner to maximize your pain control:  6am: Tylenol as needed  9am: Motrin as needed   12noon: Tylenol as needed  2pm: Motrin as needed  4pm: Tylenol as needed  6pm: Motrin as needed  You may use the Oxycodone if you are experiencing pain that cannot be controlled by using tylenol and motrin.    Care Providers for Follow up (PCP/Outpatient Provider)	Bakari Faulkner)  Cardiovascular Surgery  [address] [place] [address]  Phone: [phone]  Fax: [phone]  Scheduled Appointment: 05/03/2022 11:45 AM    MATILDE MANDUJANO  Cardiology  [address] [place] [address]  Phone: ([phone]  Fax: [phone]  Scheduled Appointment: 04/28/2022 11:45 AM  	  Discharge Diet	DASH Diet  Activity	Do not drive or operate machinery, Do not make important decisions, No heavy lifting/straining, Showering allowed, Stairs allowed, Walking - Indoors allowed, Walking - Outdoors allowed, Follow Instructions Provided by your Surgical Team  
62 yo female presents with PMH of HTN, HLD,hypothyroidism, aortic and mitral valve insufficiency. She is referred by Dr. Nguyen. She reports follow up by Dr. Nguyen every 6 months with serial echocardiograms until covid pandemic in 2020. In October 2021 she had echo that revealed worsening degree of mitral valve insufficiency. 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 NYHA 3. On 4/18/22 patient underwent minimally invasive MV repair.  Patient received no blood or products in OR.  Extubated on POD 0.  POD 1 Cardene transitioned to BB.  Transferred to floor POD 1.  POD 2 ambulating, started medrol dose pack.    ==== Neurovascular ====  -No delirium, pain well managed on current regimen  -C/w PRNs for Pain control  -Monitor neuro status    ==== Respiratory ====  -Saturates well on RA     -AM CXR stable, repeat in AM  -Encourage IS 10x/hour while awake, Cough and deep breathing exercises  -Monitor respiratory status via SpO2    ==== Cardiovascular ====  Monitor on Tele  Continue aspirin 81mg QD  Continue Statin  Continue Lopressor 12.5mg Q6hrs, titrate as tolerated  severe MR s/p minimally invasive MV repair, POD 2    ==== GI ====   -Tolerating PO  -Prophylaxis: Protonix  -C/w bowel regimen    ==== /Renal ====  -BUN/Cr: 9/0.64  -Trend Cr on AM labs  -Replete electrolytes as needed    ==== ID ====   Afebrile, asymptomatic  -WBC: 11.5  -Continue to monitor for SIRS/Sepsis syndrome while inpatient    ==== Endocrine ====   -A1C: 5.4, no h/o DM  -TSH: 2.710, no h/o thyroid disease     ==== Hematologic ====   -H/H: 10.2/31  -CBC, chem in AM  -DVT ppx: HSQ 5000u q8h and SCDs    ==== Disposition Planning ====  Telemetry

## 2022-04-25 ENCOUNTER — APPOINTMENT (OUTPATIENT)
Dept: CARE COORDINATION | Facility: HOME HEALTH | Age: 62
End: 2022-04-25
Payer: COMMERCIAL

## 2022-04-25 VITALS — WEIGHT: 118 LBS | RESPIRATION RATE: 14 BRPM | BODY MASS INDEX: 20.9 KG/M2

## 2022-04-25 DIAGNOSIS — I34.0 NONRHEUMATIC MITRAL (VALVE) INSUFFICIENCY: ICD-10-CM

## 2022-04-25 PROCEDURE — 99024 POSTOP FOLLOW-UP VISIT: CPT

## 2022-04-25 NOTE — PHYSICAL EXAM
[] : no respiratory distress [Exaggerated Use Of Accessory Muscles For Inspiration] : no accessory muscle use [Abnormal Walk] : normal gait [Oriented To Time, Place, And Person] : oriented to person, place, and time [Affect] : the affect was normal [FreeTextEntry1] : Right mini thoracotomy without erythema or drainage, with edges well-approximated. CT sites with scant drainage on dressing, no erythema. Right fem cutdown without erythema or drainage, with edges well-approximated.

## 2022-04-25 NOTE — ASSESSMENT
[FreeTextEntry1] : Pt. is a 61 year old female with history of HTN, HLD, Hypothyroidism, aortic and mitral insufficiency. On 4/18/22 pt. underwent minimally invasive MV repair w/ Dr. Faulkner. She was extubated on POD 0. Postop course unremarkable. \par \par Pt. now recovering well s/p MV repair. Her pain is well-controlled and she is free from s/s of infection.

## 2022-04-25 NOTE — REVIEW OF SYSTEMS
[Cough] : cough [Constipation] : constipation [Fever] : no fever [Feeling Poorly] : not feeling poorly [Heart Rate Is Slow] : the heart rate was not slow [Heart Rate Is Fast] : the heart rate was not fast [Palpitations] : no palpitations [Lower Ext Edema] : no lower extremity edema [Shortness Of Breath] : no shortness of breath [SOB on Exertion] : no shortness of breath during exertion [Abdominal Pain] : no abdominal pain [Dizziness] : no dizziness [FreeTextEntry5] : Incisional chest pain

## 2022-04-25 NOTE — HISTORY OF PRESENT ILLNESS
[Home] : at home, [unfilled] , at the time of the visit. [Other Location: e.g. Home (Enter Location, City,State)___] : at [unfilled] [Verbal consent obtained from patient] : the patient, [unfilled] [FreeTextEntry1] : \par This pt. is enrolled in the Follow Your Heart (FYH) program through [employer]. Ms. ALVAREZ is a pt. of Dr. Faulkner s/p minimally invasive MV repair on 4/18. Discharged home from [facility] on 4/21.\par \par Pt. seen for post-hospitalization transitional care management and post-surgical follow-up. During telehealth encounter the pt was in no acute distress. She reports that hourly IS use and OTC Mucinex has helped her to clear secretions. Pain is well-controlled w/ PRN Oxycodone & Tylenol. Pt. hasn't had a BM since hospital discharge but is passing gas - denies abdominal pain, N/V. Pt has all medications per DC instructions and is taking them as prescribed. Denies fever, SOB, CP or palpitations. VN saw pt. over the weekend w/ no issues/concerns.

## 2022-04-27 RX ORDER — POLYETHYLENE GLYCOL 3350 17 G/17G
17 POWDER, FOR SOLUTION ORAL DAILY
Qty: 1 | Refills: 0 | Status: COMPLETED | COMMUNITY
Start: 2022-04-25 | End: 2022-04-27

## 2022-04-27 RX ORDER — CALCIPOTRIENE 50 UG/G
0.01 CREAM TOPICAL
Refills: 0 | Status: COMPLETED | COMMUNITY
End: 2022-04-27

## 2022-04-27 RX ORDER — BETAMETHASONE DIPROPIONATE 0.5 MG/G
0.05 OINTMENT, AUGMENTED TOPICAL
Refills: 0 | Status: COMPLETED | COMMUNITY
End: 2022-04-27

## 2022-04-27 RX ORDER — OLMESARTAN MEDOXOMIL 40 MG/1
40 TABLET, FILM COATED ORAL DAILY
Refills: 0 | Status: COMPLETED | COMMUNITY
End: 2022-04-27

## 2022-05-03 ENCOUNTER — OUTPATIENT (OUTPATIENT)
Dept: OUTPATIENT SERVICES | Facility: HOSPITAL | Age: 62
LOS: 1 days | End: 2022-05-03
Payer: COMMERCIAL

## 2022-05-03 ENCOUNTER — RESULT REVIEW (OUTPATIENT)
Age: 62
End: 2022-05-03

## 2022-05-03 ENCOUNTER — APPOINTMENT (OUTPATIENT)
Dept: CARDIOTHORACIC SURGERY | Facility: CLINIC | Age: 62
End: 2022-05-03
Payer: COMMERCIAL

## 2022-05-03 VITALS
SYSTOLIC BLOOD PRESSURE: 101 MMHG | BODY MASS INDEX: 23.04 KG/M2 | OXYGEN SATURATION: 97 % | RESPIRATION RATE: 16 BRPM | HEART RATE: 88 BPM | HEIGHT: 63 IN | DIASTOLIC BLOOD PRESSURE: 53 MMHG | WEIGHT: 130 LBS

## 2022-05-03 DIAGNOSIS — Z98.890 OTHER SPECIFIED POSTPROCEDURAL STATES: Chronic | ICD-10-CM

## 2022-05-03 DIAGNOSIS — I10 ESSENTIAL (PRIMARY) HYPERTENSION: ICD-10-CM

## 2022-05-03 DIAGNOSIS — I34.0 NONRHEUMATIC MITRAL (VALVE) INSUFFICIENCY: ICD-10-CM

## 2022-05-03 DIAGNOSIS — E03.9 HYPOTHYROIDISM, UNSPECIFIED: ICD-10-CM

## 2022-05-03 DIAGNOSIS — Z98.890 OTHER SPECIFIED POSTPROCEDURAL STATES: ICD-10-CM

## 2022-05-03 DIAGNOSIS — E78.5 HYPERLIPIDEMIA, UNSPECIFIED: ICD-10-CM

## 2022-05-03 DIAGNOSIS — R34 ANURIA AND OLIGURIA: ICD-10-CM

## 2022-05-03 DIAGNOSIS — Z87.891 PERSONAL HISTORY OF NICOTINE DEPENDENCE: ICD-10-CM

## 2022-05-03 DIAGNOSIS — I27.20 PULMONARY HYPERTENSION, UNSPECIFIED: ICD-10-CM

## 2022-05-03 DIAGNOSIS — Z88.0 ALLERGY STATUS TO PENICILLIN: ICD-10-CM

## 2022-05-03 DIAGNOSIS — Z09 ENCOUNTER FOR FOLLOW-UP EXAMINATION AFTER COMPLETED TREATMENT FOR CONDITIONS OTHER THAN MALIGNANT NEOPLASM: ICD-10-CM

## 2022-05-03 PROCEDURE — 71046 X-RAY EXAM CHEST 2 VIEWS: CPT | Mod: 26

## 2022-05-03 PROCEDURE — 99024 POSTOP FOLLOW-UP VISIT: CPT

## 2022-05-03 PROCEDURE — 71046 X-RAY EXAM CHEST 2 VIEWS: CPT

## 2022-05-20 ENCOUNTER — TRANSCRIPTION ENCOUNTER (OUTPATIENT)
Age: 62
End: 2022-05-20

## 2022-09-08 NOTE — PATIENT PROFILE ADULT - PACKS YRS CALCULATION
Patient called wishing to cancel her surgery on 10/21 as she needs to leave to go to [place] and wants to reschedule for January when she returns.  Soledad Roque CMA     10

## 2024-03-28 ENCOUNTER — NON-APPOINTMENT (OUTPATIENT)
Age: 64
End: 2024-03-28

## 2025-06-09 ENCOUNTER — NON-APPOINTMENT (OUTPATIENT)
Age: 65
End: 2025-06-09

## (undated) DEVICE — SUCTION CATH ARGYLE WHISTLE TIP 14FR STRAIGHT PACKED

## (undated) DEVICE — DRAPE TOWEL BLUE 17" X 24"

## (undated) DEVICE — SUT ETHIBOND 0 18" TIES

## (undated) DEVICE — DRSG BIOPATCH DISK W CHG 1" W 4.0MM HOLE

## (undated) DEVICE — SUT TICRON 2-0 36" CV-316 DA

## (undated) DEVICE — KNIFE ALCON STANDARD FULL HANDLE 15 DEG (PINK)

## (undated) DEVICE — DRAPE TOWEL WHITE 17" X 24"

## (undated) DEVICE — TUBING PERFUSION ORGANIZER

## (undated) DEVICE — CATH CV TRAY INSR ST UNIV

## (undated) DEVICE — BLADE SCALPEL SAFETY #15 WITH PLASTIC GREEN HANDLE

## (undated) DEVICE — DRSG CURITY GAUZE SPONGE 4 X 4" 12-PLY

## (undated) DEVICE — SUT VICRYL 0 27" CT

## (undated) DEVICE — SUT PROLENE 5-0 18" RB-1

## (undated) DEVICE — TUBING SMOKE EVAC 3/8" X 10FT FOR NEPTUNE

## (undated) DEVICE — SUT PROLENE 6-0 30" RB-2

## (undated) DEVICE — SUT PROLENE 4-0 36" SH

## (undated) DEVICE — DRSG TRACH DRAINAGE 4X4

## (undated) DEVICE — GOWN TRIMAX XXL

## (undated) DEVICE — CATH NG SALEM SUMP 16FR

## (undated) DEVICE — TUBING STRYKER PNEUMOSURE HI FLOW INSUFFLATOR

## (undated) DEVICE — SUT PROLENE 7-0 24" BV175-6

## (undated) DEVICE — SUT PROLENE 7-0 24" BV-1

## (undated) DEVICE — SUT DOUBLE 6 WIRE STERNAL

## (undated) DEVICE — BLADE SCALPEL SAFETY #11 WITH PLASTIC GREEN HANDLE

## (undated) DEVICE — SUT PROLENE 4-0 36" RB-1

## (undated) DEVICE — VENTING ADAPTER "Y" (RED/BLUE) 7.5"

## (undated) DEVICE — PACK OPEN HEART LNX

## (undated) DEVICE — DRAIN RESERVOIR FOR JACKSON PRATT 100CC CARDINAL

## (undated) DEVICE — SUT GORETEX CV-4 (3-0) 36" TH-22

## (undated) DEVICE — DRSG ALLEVYN LIFE 6 X 6 (PINK)

## (undated) DEVICE — SUT CARDIONYL 4-0 20MM

## (undated) DEVICE — SUT PROLENE 3-0 36" SH

## (undated) DEVICE — Device

## (undated) DEVICE — FOLEY TRAY 16FR 5CC LF LUBRISIL ADVANCE TEMP CLOSED

## (undated) DEVICE — DRAPE IOBAN 33" X 23"

## (undated) DEVICE — PREP SCRUB BRUSH W CHG 4%

## (undated) DEVICE — SUT STAINLESS STEEL 7 4-18" CCS

## (undated) DEVICE — DRAPE PROBE COVER 5" X 96"

## (undated) DEVICE — PACING CABLE SURGICAL 12FT WITH SMALL CLIP

## (undated) DEVICE — DRSG DERMABOND 0.7ML

## (undated) DEVICE — ELCTR STRYKER NEPTUNE SMOKE EVACUATION PENCIL (GREEN)

## (undated) DEVICE — ELCTR STRYKER EXTENSION SUCTION TIP 125MM

## (undated) DEVICE — TUBING BRAT 2 SUCTION ASSEMBLY TWIST LOCK

## (undated) DEVICE — DVC ASCOPE 4 SNGL USE SLIM

## (undated) DEVICE — SUT VICRYL 1 36" CTX UNDYED

## (undated) DEVICE — SUT STAINLESS STEEL 6 4-18" CCS

## (undated) DEVICE — CHEST DRAIN OASIS DRY SUCTION WATER SEAL

## (undated) DEVICE — SUT PROLENE 8-0 24" BV175-6

## (undated) DEVICE — DRAPE FLUID WARMER 44 X 66"

## (undated) DEVICE — VESSEL LOOP MAXI-BLUE 0.120" X 16"

## (undated) DEVICE — TONGUE DEPRESSOR

## (undated) DEVICE — DRSG MEPILEX 10 X 25CM (4 X 10") AG

## (undated) DEVICE — SOL ANTI FOG

## (undated) DEVICE — TAPE UMBILICAL 1/8 X 30" STRANDS

## (undated) DEVICE — SUT VICRYL 2-0 27" CT-1

## (undated) DEVICE — SUT NUROLON 1 18" OS-8 (POP-OFF)

## (undated) DEVICE — DRSG RESTRAINT LIMB WRAP AROUND

## (undated) DEVICE — SUT ETHIBOND 2-0 4-30" V-4 WHITE

## (undated) DEVICE — TOURNIQUET SET 12FR (1 RED, 1 BLUE, 3 CLEAR, 1 SNARE) 7"

## (undated) DEVICE — SYS ATRIAL LIFT

## (undated) DEVICE — SUT MONOCRYL 4-0 27" PS-2 UNDYED

## (undated) DEVICE — GLV 7 PROTEXIS (WHITE)

## (undated) DEVICE — POSITIONER FOAM EGG CRATE ULNAR 2PCS (PINK)